# Patient Record
Sex: MALE | Race: WHITE | NOT HISPANIC OR LATINO | Employment: OTHER | URBAN - METROPOLITAN AREA
[De-identification: names, ages, dates, MRNs, and addresses within clinical notes are randomized per-mention and may not be internally consistent; named-entity substitution may affect disease eponyms.]

---

## 2021-06-03 ENCOUNTER — OFFICE VISIT (OUTPATIENT)
Dept: URGENT CARE | Facility: CLINIC | Age: 41
End: 2021-06-03
Payer: OTHER GOVERNMENT

## 2021-06-03 VITALS
HEART RATE: 72 BPM | RESPIRATION RATE: 18 BRPM | HEIGHT: 72 IN | DIASTOLIC BLOOD PRESSURE: 87 MMHG | TEMPERATURE: 98 F | BODY MASS INDEX: 24.11 KG/M2 | SYSTOLIC BLOOD PRESSURE: 147 MMHG | WEIGHT: 178 LBS | OXYGEN SATURATION: 96 %

## 2021-06-03 DIAGNOSIS — N34.2 URETHRITIS: ICD-10-CM

## 2021-06-03 DIAGNOSIS — R03.0 ELEVATED BP WITHOUT DIAGNOSIS OF HYPERTENSION: ICD-10-CM

## 2021-06-03 DIAGNOSIS — A64 STD (SEXUALLY TRANSMITTED DISEASE): ICD-10-CM

## 2021-06-03 DIAGNOSIS — N48.89 PAIN IN PENIS: Primary | ICD-10-CM

## 2021-06-03 LAB
BILIRUB UR QL STRIP: NEGATIVE
GLUCOSE UR QL STRIP: NEGATIVE
KETONES UR QL STRIP: NEGATIVE
LEUKOCYTE ESTERASE UR QL STRIP: NEGATIVE
PH, POC UA: 5 (ref 5–8)
POC BLOOD, URINE: NEGATIVE
POC NITRATES, URINE: NEGATIVE
PROT UR QL STRIP: NEGATIVE
SP GR UR STRIP: 1.02 (ref 1–1.03)
UROBILINOGEN UR STRIP-ACNC: NORMAL (ref 0.3–2.2)

## 2021-06-03 PROCEDURE — 80074 ACUTE HEPATITIS PANEL: CPT | Performed by: FAMILY MEDICINE

## 2021-06-03 PROCEDURE — 87491 CHLMYD TRACH DNA AMP PROBE: CPT | Performed by: FAMILY MEDICINE

## 2021-06-03 PROCEDURE — 96372 THER/PROPH/DIAG INJ SC/IM: CPT | Mod: S$GLB,,, | Performed by: FAMILY MEDICINE

## 2021-06-03 PROCEDURE — 81003 URINALYSIS AUTO W/O SCOPE: CPT | Mod: QW,S$GLB,, | Performed by: FAMILY MEDICINE

## 2021-06-03 PROCEDURE — 96372 PR INJECTION,THERAP/PROPH/DIAG2ST, IM OR SUBCUT: ICD-10-PCS | Mod: S$GLB,,, | Performed by: FAMILY MEDICINE

## 2021-06-03 PROCEDURE — 99214 OFFICE O/P EST MOD 30 MIN: CPT | Mod: 25,S$GLB,, | Performed by: FAMILY MEDICINE

## 2021-06-03 PROCEDURE — 81003 POCT URINALYSIS, DIPSTICK, AUTOMATED, W/O SCOPE: ICD-10-PCS | Mod: QW,S$GLB,, | Performed by: FAMILY MEDICINE

## 2021-06-03 PROCEDURE — 87591 N.GONORRHOEAE DNA AMP PROB: CPT | Performed by: FAMILY MEDICINE

## 2021-06-03 PROCEDURE — 86592 SYPHILIS TEST NON-TREP QUAL: CPT | Performed by: FAMILY MEDICINE

## 2021-06-03 PROCEDURE — 86703 HIV-1/HIV-2 1 RESULT ANTBDY: CPT | Performed by: FAMILY MEDICINE

## 2021-06-03 PROCEDURE — 99214 PR OFFICE/OUTPT VISIT, EST, LEVL IV, 30-39 MIN: ICD-10-PCS | Mod: 25,S$GLB,, | Performed by: FAMILY MEDICINE

## 2021-06-03 RX ORDER — AZITHROMYCIN 500 MG/1
1000 TABLET, FILM COATED ORAL ONCE
Qty: 2 TABLET | Refills: 0 | Status: SHIPPED | OUTPATIENT
Start: 2021-06-03 | End: 2021-06-03

## 2021-06-03 RX ORDER — CEFTRIAXONE 500 MG/1
500 INJECTION, POWDER, FOR SOLUTION INTRAMUSCULAR; INTRAVENOUS
Status: COMPLETED | OUTPATIENT
Start: 2021-06-03 | End: 2021-06-03

## 2021-06-03 RX ORDER — VALACYCLOVIR HYDROCHLORIDE 1 G/1
2000 TABLET, FILM COATED ORAL
COMMUNITY
Start: 2021-03-03 | End: 2022-10-28 | Stop reason: SDUPTHER

## 2021-06-03 RX ORDER — FINASTERIDE 1 MG/1
1 TABLET, FILM COATED ORAL
COMMUNITY
Start: 2021-04-29 | End: 2023-09-21 | Stop reason: SDUPTHER

## 2021-06-03 RX ADMIN — CEFTRIAXONE 500 MG: 500 INJECTION, POWDER, FOR SOLUTION INTRAMUSCULAR; INTRAVENOUS at 05:06

## 2021-06-04 LAB
HAV IGM SERPL QL IA: NEGATIVE
HBV CORE IGM SERPL QL IA: NEGATIVE
HBV SURFACE AG SERPL QL IA: NEGATIVE
HCV AB SERPL QL IA: NEGATIVE
RPR SER QL: NORMAL

## 2021-06-05 LAB
C TRACH DNA SPEC QL NAA+PROBE: NOT DETECTED
N GONORRHOEA DNA SPEC QL NAA+PROBE: NOT DETECTED

## 2021-06-08 ENCOUNTER — TELEPHONE (OUTPATIENT)
Dept: URGENT CARE | Facility: CLINIC | Age: 41
End: 2021-06-08

## 2021-06-08 LAB — HIV 1+2 AB+HIV1 P24 AG SERPL QL IA: NEGATIVE

## 2021-06-28 ENCOUNTER — SPECIALTY PHARMACY (OUTPATIENT)
Dept: PHARMACY | Facility: CLINIC | Age: 41
End: 2021-06-28

## 2021-06-28 ENCOUNTER — OFFICE VISIT (OUTPATIENT)
Dept: URGENT CARE | Facility: CLINIC | Age: 41
End: 2021-06-28
Payer: OTHER GOVERNMENT

## 2021-06-28 VITALS
HEART RATE: 73 BPM | SYSTOLIC BLOOD PRESSURE: 166 MMHG | RESPIRATION RATE: 16 BRPM | HEIGHT: 72 IN | DIASTOLIC BLOOD PRESSURE: 84 MMHG | BODY MASS INDEX: 23.7 KG/M2 | OXYGEN SATURATION: 98 % | TEMPERATURE: 98 F | WEIGHT: 175 LBS

## 2021-06-28 DIAGNOSIS — Z79.899 ON PRE-EXPOSURE PROPHYLAXIS FOR HIV: Primary | ICD-10-CM

## 2021-06-28 PROCEDURE — 99214 PR OFFICE/OUTPT VISIT, EST, LEVL IV, 30-39 MIN: ICD-10-PCS | Mod: S$GLB,,, | Performed by: FAMILY MEDICINE

## 2021-06-28 PROCEDURE — 99214 OFFICE O/P EST MOD 30 MIN: CPT | Mod: S$GLB,,, | Performed by: FAMILY MEDICINE

## 2021-06-28 RX ORDER — EMTRICITABINE AND TENOFOVIR DISOPROXIL FUMARATE 200; 300 MG/1; MG/1
1 TABLET, FILM COATED ORAL DAILY
Qty: 30 TABLET | Refills: 0 | Status: SHIPPED | OUTPATIENT
Start: 2021-06-28 | End: 2021-07-27 | Stop reason: SDUPTHER

## 2021-07-21 DIAGNOSIS — Z79.899 ON PRE-EXPOSURE PROPHYLAXIS FOR HIV: ICD-10-CM

## 2021-07-21 RX ORDER — EMTRICITABINE AND TENOFOVIR DISOPROXIL FUMARATE 200; 300 MG/1; MG/1
1 TABLET, FILM COATED ORAL DAILY
Qty: 30 TABLET | Refills: 0 | Status: CANCELLED | OUTPATIENT
Start: 2021-07-21 | End: 2022-07-21

## 2021-07-27 ENCOUNTER — SPECIALTY PHARMACY (OUTPATIENT)
Dept: PHARMACY | Facility: CLINIC | Age: 41
End: 2021-07-27

## 2021-07-27 ENCOUNTER — OFFICE VISIT (OUTPATIENT)
Dept: INTERNAL MEDICINE | Facility: CLINIC | Age: 41
End: 2021-07-27
Payer: OTHER GOVERNMENT

## 2021-07-27 DIAGNOSIS — Z79.899 ON PRE-EXPOSURE PROPHYLAXIS FOR HIV: ICD-10-CM

## 2021-07-27 PROCEDURE — 99213 OFFICE O/P EST LOW 20 MIN: CPT | Mod: 95,,, | Performed by: FAMILY MEDICINE

## 2021-07-27 PROCEDURE — 99213 PR OFFICE/OUTPT VISIT, EST, LEVL III, 20-29 MIN: ICD-10-PCS | Mod: 95,,, | Performed by: FAMILY MEDICINE

## 2021-07-27 RX ORDER — EMTRICITABINE AND TENOFOVIR DISOPROXIL FUMARATE 200; 300 MG/1; MG/1
1 TABLET, FILM COATED ORAL DAILY
Qty: 90 TABLET | Refills: 0 | Status: SHIPPED | OUTPATIENT
Start: 2021-07-27 | End: 2021-10-21 | Stop reason: SDUPTHER

## 2021-07-28 ENCOUNTER — PATIENT MESSAGE (OUTPATIENT)
Dept: PHARMACY | Facility: CLINIC | Age: 41
End: 2021-07-28

## 2021-08-20 ENCOUNTER — PATIENT MESSAGE (OUTPATIENT)
Dept: PHARMACY | Facility: CLINIC | Age: 41
End: 2021-08-20

## 2021-08-23 ENCOUNTER — SPECIALTY PHARMACY (OUTPATIENT)
Dept: PHARMACY | Facility: CLINIC | Age: 41
End: 2021-08-23

## 2021-09-30 ENCOUNTER — SPECIALTY PHARMACY (OUTPATIENT)
Dept: PHARMACY | Facility: CLINIC | Age: 41
End: 2021-09-30

## 2021-09-30 DIAGNOSIS — Z79.899 ON PRE-EXPOSURE PROPHYLAXIS FOR HIV: Primary | ICD-10-CM

## 2021-10-21 ENCOUNTER — SPECIALTY PHARMACY (OUTPATIENT)
Dept: PHARMACY | Facility: CLINIC | Age: 41
End: 2021-10-21
Payer: OTHER GOVERNMENT

## 2021-10-21 DIAGNOSIS — Z79.899 ON PRE-EXPOSURE PROPHYLAXIS FOR HIV: ICD-10-CM

## 2021-10-22 RX ORDER — EMTRICITABINE AND TENOFOVIR DISOPROXIL FUMARATE 200; 300 MG/1; MG/1
1 TABLET, FILM COATED ORAL DAILY
Qty: 90 TABLET | Refills: 0 | Status: SHIPPED | OUTPATIENT
Start: 2021-10-22 | End: 2022-01-19

## 2021-11-30 ENCOUNTER — PATIENT MESSAGE (OUTPATIENT)
Dept: PHARMACY | Facility: CLINIC | Age: 41
End: 2021-11-30
Payer: OTHER GOVERNMENT

## 2021-12-14 ENCOUNTER — SPECIALTY PHARMACY (OUTPATIENT)
Dept: PHARMACY | Facility: CLINIC | Age: 41
End: 2021-12-14
Payer: OTHER GOVERNMENT

## 2022-01-11 ENCOUNTER — PATIENT MESSAGE (OUTPATIENT)
Dept: PHARMACY | Facility: CLINIC | Age: 42
End: 2022-01-11
Payer: OTHER GOVERNMENT

## 2022-01-19 ENCOUNTER — SPECIALTY PHARMACY (OUTPATIENT)
Dept: PHARMACY | Facility: CLINIC | Age: 42
End: 2022-01-19
Payer: OTHER GOVERNMENT

## 2022-01-19 NOTE — TELEPHONE ENCOUNTER
[facility] contacted patient for Truvada as PrEP refill. The patient expressed that he no longer wants to continue PrEP since he is now in a stable relationship. He plans to have this conversation with his provider as well. Patient agreed to have [facility] close his profile since medication is no longer needed. He knows to contact [facility] if medication is ever needed in the future. No other questions or concerns at this time.

## 2022-10-28 ENCOUNTER — LAB VISIT (OUTPATIENT)
Dept: LAB | Facility: HOSPITAL | Age: 42
End: 2022-10-28
Attending: INTERNAL MEDICINE
Payer: OTHER GOVERNMENT

## 2022-10-28 ENCOUNTER — OFFICE VISIT (OUTPATIENT)
Dept: PRIMARY CARE CLINIC | Facility: CLINIC | Age: 42
End: 2022-10-28
Payer: OTHER GOVERNMENT

## 2022-10-28 VITALS
SYSTOLIC BLOOD PRESSURE: 114 MMHG | OXYGEN SATURATION: 99 % | HEIGHT: 72 IN | TEMPERATURE: 98 F | BODY MASS INDEX: 23.86 KG/M2 | RESPIRATION RATE: 18 BRPM | HEART RATE: 55 BPM | WEIGHT: 176.13 LBS | DIASTOLIC BLOOD PRESSURE: 76 MMHG

## 2022-10-28 DIAGNOSIS — L64.9 ANDROGENIC ALOPECIA: ICD-10-CM

## 2022-10-28 DIAGNOSIS — Z00.00 WELL ADULT EXAM: Primary | ICD-10-CM

## 2022-10-28 DIAGNOSIS — Z79.899 ON PRE-EXPOSURE PROPHYLAXIS FOR HIV: ICD-10-CM

## 2022-10-28 DIAGNOSIS — K52.9 CHRONIC DIARRHEA: ICD-10-CM

## 2022-10-28 DIAGNOSIS — B00.1 FEVER BLISTER: ICD-10-CM

## 2022-10-28 PROCEDURE — 80053 COMPREHEN METABOLIC PANEL: CPT | Performed by: INTERNAL MEDICINE

## 2022-10-28 PROCEDURE — 99396 PREV VISIT EST AGE 40-64: CPT | Mod: S$PBB,,, | Performed by: INTERNAL MEDICINE

## 2022-10-28 PROCEDURE — 99999 PR PBB SHADOW E&M-EST. PATIENT-LVL V: CPT | Mod: PBBFAC,,, | Performed by: INTERNAL MEDICINE

## 2022-10-28 PROCEDURE — 99999 PR PBB SHADOW E&M-EST. PATIENT-LVL V: ICD-10-PCS | Mod: PBBFAC,,, | Performed by: INTERNAL MEDICINE

## 2022-10-28 PROCEDURE — 87389 HIV-1 AG W/HIV-1&-2 AB AG IA: CPT | Performed by: INTERNAL MEDICINE

## 2022-10-28 PROCEDURE — 99396 PR PREVENTIVE VISIT,EST,40-64: ICD-10-PCS | Mod: S$PBB,,, | Performed by: INTERNAL MEDICINE

## 2022-10-28 PROCEDURE — 36415 COLL VENOUS BLD VENIPUNCTURE: CPT | Mod: PN | Performed by: INTERNAL MEDICINE

## 2022-10-28 PROCEDURE — 99215 OFFICE O/P EST HI 40 MIN: CPT | Mod: PBBFAC,PN | Performed by: INTERNAL MEDICINE

## 2022-10-28 RX ORDER — EMTRICITABINE AND TENOFOVIR DISOPROXIL FUMARATE 200; 300 MG/1; MG/1
TABLET, FILM COATED ORAL
COMMUNITY
Start: 2021-07-14 | End: 2022-10-28 | Stop reason: SDUPTHER

## 2022-10-28 RX ORDER — EMTRICITABINE AND TENOFOVIR DISOPROXIL FUMARATE 200; 300 MG/1; MG/1
1 TABLET, FILM COATED ORAL DAILY
Qty: 30 TABLET | Refills: 3 | Status: SHIPPED | OUTPATIENT
Start: 2022-10-28

## 2022-10-28 RX ORDER — VALACYCLOVIR HYDROCHLORIDE 1 G/1
2000 TABLET, FILM COATED ORAL
Qty: 30 TABLET | Refills: 0 | Status: SHIPPED | OUTPATIENT
Start: 2022-10-28 | End: 2023-01-30 | Stop reason: SDUPTHER

## 2022-10-28 RX ORDER — CIPROFLOXACIN 500 MG/1
500 TABLET ORAL EVERY 12 HOURS
Qty: 14 TABLET | Refills: 0 | Status: SHIPPED | OUTPATIENT
Start: 2022-10-28 | End: 2023-03-07

## 2022-10-28 NOTE — PROGRESS NOTES
[facility] Progress Note  10/28/2022          Reason for Visit:      had concerns including Establish Care, Annual Exam, and Medication Refill.       History of Present Illness:     Pt is here today to establish primary care.      Pt was previously on Truvada for PREP for HIV prophylaxis.  Was off for a while because he was in a monogamous relationship, but that relationship ended and so he would like to get back on the medication.  Reviewed labs from May 2022. Baseline creatinine has been stable around 1.36.  He works out 5 days per week.    On prior infection with gonorrhea, but no other history of STI.  Not currently having any symptoms.    Pt has chronic diarrhea and urgency to defectate.  No blood in stool  No unexpected weight loss or fevers.  No abdominal pains.  He was referred to GI by [facility] but never could make appt  He is interested in getting a new referral.    He is travelling to [place] and would like to get rx for cipro just in case.  He has history of fever blisters and would like to get a refill on valtrex.    On finasteride for androgenetic alopecia  No side effects            Problem List:     Patient Active Problem List   Diagnosis    On pre-exposure prophylaxis for HIV    Androgenic alopecia         Surgical History:     He has a past surgical history that includes none.      Family History:      His family history includes Colon cancer in his maternal grandmother; Colon cancer (age of onset: 58) in his maternal uncle; Hyperlipidemia in his father; Hypertension in his father; Thyroid disease in his mother.      Social History:     He reports that he has never smoked. He has never used smokeless tobacco. He reports current alcohol use of about 3.0 standard drinks per week. He reports that he does not use drugs.      Medications:       Current Outpatient Medications:     finasteride (PROPECIA) 1 mg tablet, Take 1 mg by mouth., Disp: , Rfl:     ciprofloxacin HCl (CIPRO)  500 MG tablet, Take 1 tablet (500 mg total) by mouth every 12 (twelve) hours., Disp: 14 tablet, Rfl: 0    emtricitabine-tenofovir 200-300 mg (TRUVADA) 200-300 mg Tab, Take 1 tablet by mouth once daily., Disp: 30 tablet, Rfl: 3    valACYclovir (VALTREX) 1000 MG tablet, Take 2 tablets (2,000 mg total) by mouth as needed., Disp: 30 tablet, Rfl: 0        Allergies:   He has No Known Allergies.      Review of Systems:     Review of Systems   Constitutional:  Negative for activity change and unexpected weight change.   HENT:  Negative for hearing loss, rhinorrhea and trouble swallowing.    Eyes:  Negative for discharge and visual disturbance.   Respiratory:  Negative for chest tightness and wheezing.    Cardiovascular:  Negative for chest pain and palpitations.   Gastrointestinal:  Positive for diarrhea. Negative for blood in stool, constipation and vomiting.   Endocrine: Negative for polydipsia and polyuria.   Genitourinary:  Negative for difficulty urinating, hematuria and urgency.   Musculoskeletal:  Negative for arthralgias, joint swelling and neck pain.   Neurological:  Negative for weakness and headaches.   Psychiatric/Behavioral:  Negative for confusion and dysphoric mood.          Physical Exam:     Temp:    97.9 °F (36.6 °C) (Oral)        Blood Pressure:  114/76        Pulse:   (!) 55     Respirations:  18  Weight:   79.9 kg (176 lb 2.4 oz)  Height:   6' (1.829 m)  BMI:     Body mass index is 23.89 kg/m².    Physical Exam  Constitutional:       General: He is not in acute distress.  HENT:      Right Ear: Tympanic membrane normal.      Left Ear: Tympanic membrane normal.      Nose: No congestion or rhinorrhea.      Mouth/Throat:      Pharynx: No oropharyngeal exudate or posterior oropharyngeal erythema.   Cardiovascular:      Rate and Rhythm: Normal rate and regular rhythm.   Pulmonary:      Effort: Pulmonary effort is normal. No respiratory distress.      Breath sounds: No wheezing.   Abdominal:      Palpations:  Abdomen is soft.      Tenderness: There is no abdominal tenderness.   Skin:     General: Skin is warm.   Neurological:      General: No focal deficit present.      Mental Status: He is alert and oriented to person, place, and time.         Labs/Imaging/Testing:     Reviewed labs from [facility] from may 2022:  Tchol 174  TG 67  Hdl 58    CBC ok  CMP - creatinine 1.36, otherwise normal  UA  normal      Assessment and Plan:     1. Well adult exam    2. On pre-exposure prophylaxis for HIV  Restart truvada  - Comprehensive Metabolic Panel; Future  - HIV 1/2 Ag/Ab (4th Gen); Future    3. Chronic diarrhea  - Ambulatory referral/consult to Gastroenterology; Future    4. Androgenic alopecia  Continue finasteride     RTC 3 mos or sooner mulugeta Sinclair MD  10/28/2022    This note was prepared using voice-recognition software.  Although efforts are made to proofread the note, some errors may persist in the final document.

## 2022-10-29 LAB
ALBUMIN SERPL BCP-MCNC: 4.2 G/DL (ref 3.5–5.2)
ALP SERPL-CCNC: 63 U/L (ref 55–135)
ALT SERPL W/O P-5'-P-CCNC: 15 U/L (ref 10–44)
ANION GAP SERPL CALC-SCNC: 10 MMOL/L (ref 8–16)
AST SERPL-CCNC: 24 U/L (ref 10–40)
BILIRUB SERPL-MCNC: 0.4 MG/DL (ref 0.1–1)
BUN SERPL-MCNC: 26 MG/DL (ref 6–20)
CALCIUM SERPL-MCNC: 9.2 MG/DL (ref 8.7–10.5)
CHLORIDE SERPL-SCNC: 104 MMOL/L (ref 95–110)
CO2 SERPL-SCNC: 24 MMOL/L (ref 23–29)
CREAT SERPL-MCNC: 1.1 MG/DL (ref 0.5–1.4)
EST. GFR  (NO RACE VARIABLE): >60 ML/MIN/1.73 M^2
GLUCOSE SERPL-MCNC: 61 MG/DL (ref 70–110)
HIV 1+2 AB+HIV1 P24 AG SERPL QL IA: NORMAL
POTASSIUM SERPL-SCNC: 4.9 MMOL/L (ref 3.5–5.1)
PROT SERPL-MCNC: 6.8 G/DL (ref 6–8.4)
SODIUM SERPL-SCNC: 138 MMOL/L (ref 136–145)

## 2022-10-31 ENCOUNTER — SPECIALTY PHARMACY (OUTPATIENT)
Dept: PHARMACY | Facility: CLINIC | Age: 42
End: 2022-10-31
Payer: OTHER GOVERNMENT

## 2022-10-31 DIAGNOSIS — Z79.899 ON PRE-EXPOSURE PROPHYLAXIS FOR HIV: Primary | ICD-10-CM

## 2022-10-31 NOTE — TELEPHONE ENCOUNTER
Truvada test claim - pharmacy not contracted with plan.     Benefits investigation required (Commercial - [facility]).

## 2022-11-03 NOTE — TELEPHONE ENCOUNTER
BI- [facility] - Rep- [facility]  Generic Truvada  [facility] not in network- preferred retail such as [employer] or [facility] mail  Deductible- $168 ($0 met)  [facility]- $1,120 ($167.06 met)  Estimated copay- $3 -30 days or 90 days/ $10.25

## 2022-11-04 NOTE — TELEPHONE ENCOUNTER
Attempted (attempt 1) to call patient and determine where he would like Truvada Rx. NA, LVM.     There is local [employer] on file ([facility] #[id number] - [place], [place] - [facility] [[id number]]).

## 2022-11-08 NOTE — TELEPHONE ENCOUNTER
Contacted patient regarding Truvada Rx. He opted to fill Rx with [facility] #[id number] - [place], [place] - [address] [place] [address] [address]. Rx routed per patient request. Receipt confirmed by pharmacy (11/8/2022 10:01 AM CST). Closing [facility]'s enrollment.

## 2023-02-02 ENCOUNTER — TELEPHONE (OUTPATIENT)
Dept: PRIMARY CARE CLINIC | Facility: CLINIC | Age: 43
End: 2023-02-02
Payer: OTHER GOVERNMENT

## 2023-02-02 RX ORDER — VALACYCLOVIR HYDROCHLORIDE 1 G/1
2000 TABLET, FILM COATED ORAL 2 TIMES DAILY PRN
Qty: 30 TABLET | Refills: 2 | Status: SHIPPED | OUTPATIENT
Start: 2023-02-02 | End: 2023-03-07 | Stop reason: SDUPTHER

## 2023-02-06 ENCOUNTER — PATIENT MESSAGE (OUTPATIENT)
Dept: PRIMARY CARE CLINIC | Facility: CLINIC | Age: 43
End: 2023-02-06
Payer: OTHER GOVERNMENT

## 2023-02-25 ENCOUNTER — OFFICE VISIT (OUTPATIENT)
Dept: URGENT CARE | Facility: CLINIC | Age: 43
End: 2023-02-25
Payer: OTHER GOVERNMENT

## 2023-02-25 VITALS
DIASTOLIC BLOOD PRESSURE: 84 MMHG | RESPIRATION RATE: 19 BRPM | HEIGHT: 72 IN | TEMPERATURE: 98 F | SYSTOLIC BLOOD PRESSURE: 131 MMHG | BODY MASS INDEX: 23.86 KG/M2 | HEART RATE: 68 BPM | WEIGHT: 176.13 LBS | OXYGEN SATURATION: 99 %

## 2023-02-25 DIAGNOSIS — J02.9 SORE THROAT: ICD-10-CM

## 2023-02-25 DIAGNOSIS — B37.9 CANDIDIASIS: Primary | ICD-10-CM

## 2023-02-25 PROCEDURE — 99213 OFFICE O/P EST LOW 20 MIN: CPT | Mod: S$GLB,,, | Performed by: FAMILY MEDICINE

## 2023-02-25 PROCEDURE — 99213 PR OFFICE/OUTPT VISIT, EST, LEVL III, 20-29 MIN: ICD-10-PCS | Mod: S$GLB,,, | Performed by: FAMILY MEDICINE

## 2023-02-25 RX ORDER — FLUCONAZOLE 150 MG/1
150 TABLET ORAL DAILY
Qty: 10 TABLET | Refills: 0 | Status: SHIPPED | OUTPATIENT
Start: 2023-02-25 | End: 2023-03-07

## 2023-02-25 RX ORDER — CLOTRIMAZOLE AND BETAMETHASONE DIPROPIONATE 10; .64 MG/G; MG/G
CREAM TOPICAL 2 TIMES DAILY
COMMUNITY
Start: 2023-02-06

## 2023-02-25 RX ORDER — FLUCONAZOLE 150 MG/1
150 TABLET ORAL DAILY
Qty: 10 TABLET | Refills: 0 | Status: SHIPPED | OUTPATIENT
Start: 2023-02-25 | End: 2023-02-25

## 2023-02-25 RX ORDER — CLOTRIMAZOLE 1 %
CREAM (GRAM) TOPICAL 2 TIMES DAILY
COMMUNITY
Start: 2023-01-30

## 2023-02-25 NOTE — PROGRESS NOTES
Subjective:       Patient ID: Jono Cuello is a 42 y.o. male.      Chief Complaint: Sore Throat    Patient reports to the clinic with the compliant of a sore throat that presented 3-4 weeks ago; he reports with having thrush that was dx back in January; however with oral solution Nystatin the relief was minimal and now, thrush has returned. Patient requesting oral fluconazole tablets.     Sore Throat   This is a new problem. The current episode started 1 to 4 weeks ago. The problem has been unchanged. Neither side of throat is experiencing more pain than the other. There has been no fever. The pain is at a severity of 2/10. The pain is mild. Associated symptoms include swollen glands. Pertinent negatives include no congestion, coughing, diarrhea, drooling, ear discharge, ear pain, headaches, hoarse voice, plugged ear sensation, neck pain, shortness of breath, stridor, trouble swallowing or vomiting. He has had no exposure to strep or mono. Treatments tried: Nystatin and NSAIDs. The treatment provided no relief.     HENT:  Positive for sore throat. Negative for ear pain, ear discharge, drooling, congestion and trouble swallowing.    Neck: Negative for neck pain.   Respiratory:  Negative for cough, shortness of breath and stridor.    Gastrointestinal:  Negative for vomiting and diarrhea.   Neurological:  Negative for headaches.     Objective:      Vitals:    02/25/23 1347   BP: 131/84   Pulse: 68   Resp: 19   Temp: 97.7 °F (36.5 °C)   TempSrc: Oral   SpO2: 99%   Weight: 79.9 kg (176 lb 2.4 oz)   Height: 6' (1.829 m)      Physical Exam   Constitutional: He is oriented to person, place, and time.  Non-toxic appearance. He does not appear ill. No distress.   HENT:   Head: Atraumatic.   Mouth/Throat: No oropharyngeal exudate or posterior oropharyngeal erythema.      Comments: thrush  Eyes: Conjunctivae are normal.   Neck: Neck supple.   Cardiovascular: Normal rate, regular rhythm, normal heart sounds and normal pulses.    Pulmonary/Chest: Effort normal and breath sounds normal.   Lymphadenopathy:     He has no cervical adenopathy.   Neurological: He is alert and oriented to person, place, and time.   Skin: Skin is not diaphoretic.   Psychiatric: Judgment and thought content normal.       Assessment:       1. Candidiasis    2. Sore throat          Plan:         Candidiasis  -     fluconazole (DIFLUCAN) 150 MG Tab; Take 1 tablet (150 mg total) by mouth once daily. May repeat after 72 hrs for max efficacy for 10 doses  Dispense: 10 tablet; Refill: 0    2. Sore throat  He has been tested for Gonorrhea/ Chlamydia and he reports negative results.

## 2023-03-06 ENCOUNTER — CLINICAL SUPPORT (OUTPATIENT)
Dept: INFECTIOUS DISEASES | Facility: CLINIC | Age: 43
End: 2023-03-06
Payer: OTHER GOVERNMENT

## 2023-03-06 ENCOUNTER — OFFICE VISIT (OUTPATIENT)
Dept: URGENT CARE | Facility: CLINIC | Age: 43
End: 2023-03-06
Payer: OTHER GOVERNMENT

## 2023-03-06 VITALS
BODY MASS INDEX: 23.84 KG/M2 | HEART RATE: 70 BPM | RESPIRATION RATE: 18 BRPM | HEIGHT: 72 IN | OXYGEN SATURATION: 97 % | TEMPERATURE: 98 F | DIASTOLIC BLOOD PRESSURE: 91 MMHG | WEIGHT: 176 LBS | SYSTOLIC BLOOD PRESSURE: 145 MMHG

## 2023-03-06 DIAGNOSIS — A64 STD (MALE): Primary | ICD-10-CM

## 2023-03-06 DIAGNOSIS — A53.9 SYPHILIS: ICD-10-CM

## 2023-03-06 LAB
HAV IGM SERPL QL IA: NORMAL
HBV CORE IGM SERPL QL IA: NORMAL
HBV SURFACE AG SERPL QL IA: NORMAL
HCV AB SERPL QL IA: NORMAL
HIV 1+2 AB+HIV1 P24 AG SERPL QL IA: NORMAL

## 2023-03-06 PROCEDURE — 99214 OFFICE O/P EST MOD 30 MIN: CPT | Mod: 25,S$GLB,, | Performed by: FAMILY MEDICINE

## 2023-03-06 PROCEDURE — 87661 TRICHOMONAS VAGINALIS AMPLIF: CPT | Performed by: FAMILY MEDICINE

## 2023-03-06 PROCEDURE — 99214 PR OFFICE/OUTPT VISIT, EST, LEVL IV, 30-39 MIN: ICD-10-PCS | Mod: 25,S$GLB,, | Performed by: FAMILY MEDICINE

## 2023-03-06 PROCEDURE — 87389 HIV-1 AG W/HIV-1&-2 AB AG IA: CPT | Performed by: FAMILY MEDICINE

## 2023-03-06 PROCEDURE — 80074 ACUTE HEPATITIS PANEL: CPT | Performed by: FAMILY MEDICINE

## 2023-03-06 PROCEDURE — 99211 OFF/OP EST MAY X REQ PHY/QHP: CPT | Mod: PBBFAC

## 2023-03-06 PROCEDURE — 36415 COLL VENOUS BLD VENIPUNCTURE: CPT | Performed by: FAMILY MEDICINE

## 2023-03-06 PROCEDURE — 86592 SYPHILIS TEST NON-TREP QUAL: CPT | Performed by: FAMILY MEDICINE

## 2023-03-06 PROCEDURE — 96372 THER/PROPH/DIAG INJ SC/IM: CPT | Mod: S$GLB,,, | Performed by: FAMILY MEDICINE

## 2023-03-06 PROCEDURE — 99999 PR PBB SHADOW E&M-EST. PATIENT-LVL I: ICD-10-PCS | Mod: PBBFAC,,,

## 2023-03-06 PROCEDURE — 87591 N.GONORRHOEAE DNA AMP PROB: CPT | Performed by: FAMILY MEDICINE

## 2023-03-06 PROCEDURE — 96372 PR INJECTION,THERAP/PROPH/DIAG2ST, IM OR SUBCUT: ICD-10-PCS | Mod: S$GLB,,, | Performed by: FAMILY MEDICINE

## 2023-03-06 PROCEDURE — 99999 PR PBB SHADOW E&M-EST. PATIENT-LVL I: CPT | Mod: PBBFAC,,,

## 2023-03-06 RX ORDER — CEFTRIAXONE 250 MG/1
250 INJECTION, POWDER, FOR SOLUTION INTRAMUSCULAR; INTRAVENOUS
Status: COMPLETED | OUTPATIENT
Start: 2023-03-06 | End: 2023-03-06

## 2023-03-06 RX ORDER — DOXYCYCLINE HYCLATE 100 MG
100 TABLET ORAL 2 TIMES DAILY
Qty: 14 TABLET | Refills: 0 | Status: SHIPPED | OUTPATIENT
Start: 2023-03-06

## 2023-03-06 RX ADMIN — CEFTRIAXONE 250 MG: 250 INJECTION, POWDER, FOR SOLUTION INTRAMUSCULAR; INTRAVENOUS at 10:03

## 2023-03-06 NOTE — PROGRESS NOTES
[facility] Progress Note  3/7/2023          Reason for Visit:      had concerns including Hypertension.       History of Present Illness:     On Prep  Had recent white, mildly painful tongue.  Was treated as thrush, but didn't have improvement.  Also was having mild redness at tip of penis and dysuria.  Saw urgent care yesterday after he was informed of known syphillis exposure.  Was treated with penicillin G and cetriaxone.  The dysuria has improved, but tongue not looking any different.  Agreeable to do oral GC today  Roommate also has strep and would be interested in getting tested for that,   Urine G negative yesterday, trichomonas and RPR pending  RPR 6/2021 was negative  Pt had remote syphillis infection with similar urethral symptoms that was treated.  Did not take the doxycyline rx yesterday so far.      Chronic diarrhea  Resolved after making diet changes    Androgenic alopecia  Stable    Wants routine lab screening for cholesterol, electrolytes, etc.  Has been having occasional elevated bp readings in the 130s/80s.  He is agreeable to get a home bp cuff and monitor.      Problem List:     Patient Active Problem List   Diagnosis    On pre-exposure prophylaxis for HIV    Androgenic alopecia    History of Fever blisters    STD (male)         Medications:       Current Outpatient Medications:     clotrimazole (LOTRIMIN) 1 % cream, Apply topically 2 (two) times daily., Disp: , Rfl:     clotrimazole-betamethasone 1-0.05% (LOTRISONE) cream, Apply topically 2 (two) times daily., Disp: , Rfl:     doxycycline (VIBRA-TABS) 100 MG tablet, Take 1 tablet (100 mg total) by mouth 2 (two) times daily., Disp: 14 tablet, Rfl: 0    emtricitabine-tenofovir 200-300 mg (TRUVADA) 200-300 mg Tab, Take 1 tablet by mouth once daily., Disp: 30 tablet, Rfl: 3    finasteride (PROPECIA) 1 mg tablet, Take 1 mg by mouth., Disp: , Rfl:     fluconazole (DIFLUCAN) 150 MG Tab, Take 1 tablet (150 mg total) by mouth once  daily. for 10 days, Disp: 10 tablet, Rfl: 0    ciprofloxacin HCl (CIPRO) 500 MG tablet, Take 1 tablet (500 mg total) by mouth every 12 (twelve) hours., Disp: 14 tablet, Rfl: 0    valACYclovir (VALTREX) 1000 MG tablet, Take 2 tablets (2,000 mg total) by mouth 2 (two) times daily as needed (as needed for fever blister)., Disp: 30 tablet, Rfl: 2    Current Facility-Administered Medications:     penicillin G benzathine (BICILLIN [place]) injection 2.4 Million Units, 2.4 Million Units, Intramuscular, 1 time in Clinic/HOD, Alyse Crabtree MD        Review of Systems:     Review of Systems   Constitutional:  Negative for chills and fever.   HENT:  Negative for ear pain and sore throat.    Respiratory:  Negative for cough, shortness of breath and wheezing.    Cardiovascular:  Negative for chest pain and palpitations.   Gastrointestinal:  Negative for constipation, diarrhea, nausea and vomiting.   Genitourinary:  Negative for dysuria and hematuria.   Musculoskeletal:  Negative for joint swelling and joint deformity.   Neurological:  Negative for dizziness and weakness.         Physical Exam:     Temp:             Blood Pressure:  124/82        Pulse:   61     Respirations:  18  Weight:   77.6 kg (171 lb 1.2 oz)  Height:   6' (1.829 m)  BMI:     Body mass index is 23.2 kg/m².    Physical Exam  Constitutional:       General: He is not in acute distress.  HENT:      Right Ear: Tympanic membrane normal.      Left Ear: Tympanic membrane normal.      Nose: No congestion or rhinorrhea.      Mouth/Throat:      Pharynx: No oropharyngeal exudate or posterior oropharyngeal erythema.      Comments: Mild while discoloration of throat.  No exudate on posterior pharyngx.    Cardiovascular:      Rate and Rhythm: Normal rate and regular rhythm.   Pulmonary:      Effort: Pulmonary effort is normal. No respiratory distress.      Breath sounds: No wheezing.   Abdominal:      Palpations: Abdomen is soft.      Tenderness: There is no  abdominal tenderness.   Lymphadenopathy:      Cervical: No cervical adenopathy.   Skin:     General: Skin is warm.   Neurological:      General: No focal deficit present.      Mental Status: He is alert and oriented to person, place, and time.         Labs/Imaging/Testing:       Most recent Chemistry:  Lab Results   Component Value Date     10/28/2022    K 4.9 10/28/2022     10/28/2022    CO2 24 10/28/2022    BUN 26 (H) 10/28/2022    CREATININE 1.1 10/28/2022    GLU 61 (L) 10/28/2022    CALCIUM 9.2 10/28/2022    ALT 15 10/28/2022    AST 24 10/28/2022    ALKPHOS 63 10/28/2022    PROT 6.8 10/28/2022    ALBUMIN 4.2 10/28/2022     HIV negative yesterday  Urine GC negative  RPR, Trichomonas pending      Assessment and Plan:     1. Well adult exam  - CBC Auto Differential; Future  - Comprehensive Metabolic Panel; Future  - Lipid Panel; Future  - Hemoglobin A1C; Future  - TSH; Future  - C.trach/N.gonor AMP RNA  - POCT Strep A, Molecular    2. On pre-exposure prophylaxis for HIV  - CBC Auto Differential; Future  - Comprehensive Metabolic Panel; Future  - Lipid Panel; Future  - Hemoglobin A1C; Future  - TSH; Future  - C.trach/N.gonor AMP RNA  - POCT Strep A, Molecular    3. Sore throat  - CBC Auto Differential; Future  - Comprehensive Metabolic Panel; Future  - Lipid Panel; Future  - Hemoglobin A1C; Future  - TSH; Future  - C.trach/N.gonor AMP RNA  - POCT Strep A, Molecular       Discussed monitoring bp readings at home and keeping a log to review.         Yuriy Sinclair MD  3/7/2023    This note was prepared using voice-recognition software.  Although efforts are made to proofread the note, some errors may persist in the final document.

## 2023-03-06 NOTE — PROGRESS NOTES
Pt given 2.4 million units Bicillin-LA  IM in left glut (per MD written rx order), pt tolerated well and left clinic NAD after observation time.

## 2023-03-06 NOTE — PROGRESS NOTES
Subjective:       Patient ID: Jono Cuello is a 42 y.o. male.    Vitals:  height is 6' (1.829 m) and weight is 79.8 kg (176 lb). His temperature is 98 °F (36.7 °C). His blood pressure is 145/91 (abnormal) and his pulse is 70. His respiration is 18 and oxygen saturation is 97%.     Chief Complaint: Rash    Pt complains x1 month of pain in throat rash starting to form on groin area with dysuria. Pt came in contact with syphilis.      Rash  This is a new problem. The current episode started 1 to 4 weeks ago. The problem has been gradually worsening since onset. The affected locations include the groin and genitalia (throat). The rash is characterized by blistering, redness, swelling, burning and pain. Associated with: syphilis. Associated symptoms include a sore throat. Pertinent negatives include no anorexia, congestion, cough, diarrhea, eye pain, facial edema, fatigue, fever, joint pain, nail changes, rhinorrhea, shortness of breath or vomiting.     Constitution: Negative for fatigue and fever.   HENT:  Positive for sore throat. Negative for congestion.    Eyes:  Negative for eye pain.   Respiratory:  Negative for cough and shortness of breath.    Gastrointestinal:  Negative for vomiting and diarrhea.   Skin:  Positive for rash.     Objective:      Physical Exam   Constitutional: He appears distressed. normal  HENT:   Head: Normocephalic and atraumatic.   Ears:   Right Ear: Tympanic membrane, external ear and ear canal normal.   Left Ear: Tympanic membrane, external ear and ear canal normal.   Nose: No rhinorrhea or congestion.   Mouth/Throat: Mucous membranes are moist. Posterior oropharyngeal erythema present. No oropharyngeal exudate. Oropharynx is clear.   Neck: Neck supple.   Cardiovascular: Normal rate, regular rhythm, normal heart sounds and normal pulses.   No murmur heard.  Pulmonary/Chest: Effort normal and breath sounds normal. No stridor. No respiratory distress.   Abdominal: Normal appearance and bowel  sounds are normal. Soft. flat abdomen   Genitourinary:               Comments: Healing chanchroid ulcer     Neurological: no focal deficit. He is alert and at baseline. He is disoriented. No cranial nerve deficit.   Skin: Skin is warm, dry and rash. Capillary refill takes less than 2 seconds.              Comments: Follicullar rash on both inner thighs   Psychiatric: His behavior is normal. Mood, judgment and thought content normal.   Nursing note and vitals reviewed.      Assessment:Plan:     1. STD (male)  - Trichomonas vaginalis, RNA, Qual, Urine  - C. trachomatis/N. gonorrhoeae by AMP DNA [facility]; Urine  - RPR  - HIV 1/2 Ag/Ab (4th Gen)  - HEPATITIS PANEL, ACUTE  - cefTRIAXone injection 250 mg  - doxycycline (VIBRA-TABS) 100 MG tablet; Take 1 tablet (100 mg total) by mouth 2 (two) times daily.  Dispense: 14 tablet; Refill: 0  - penicillin G benzathine (BICILLIN [place]) 2,400,000 unit/4 mL Syrg; Inject 4 mLs (2.4 Million Units total) into the muscle once. for 1 dose  Dispense: 4 mL; Refill: 0  - Ambulatory referral/consult to Infectious Disease

## 2023-03-07 ENCOUNTER — OFFICE VISIT (OUTPATIENT)
Dept: PRIMARY CARE CLINIC | Facility: CLINIC | Age: 43
End: 2023-03-07
Payer: OTHER GOVERNMENT

## 2023-03-07 ENCOUNTER — TELEPHONE (OUTPATIENT)
Dept: URGENT CARE | Facility: CLINIC | Age: 43
End: 2023-03-07
Payer: OTHER GOVERNMENT

## 2023-03-07 ENCOUNTER — LAB VISIT (OUTPATIENT)
Dept: LAB | Facility: HOSPITAL | Age: 43
End: 2023-03-07
Attending: INTERNAL MEDICINE
Payer: OTHER GOVERNMENT

## 2023-03-07 VITALS
RESPIRATION RATE: 18 BRPM | BODY MASS INDEX: 23.17 KG/M2 | OXYGEN SATURATION: 100 % | SYSTOLIC BLOOD PRESSURE: 124 MMHG | WEIGHT: 171.06 LBS | DIASTOLIC BLOOD PRESSURE: 82 MMHG | HEART RATE: 61 BPM | HEIGHT: 72 IN

## 2023-03-07 DIAGNOSIS — Z79.899 ON PRE-EXPOSURE PROPHYLAXIS FOR HIV: ICD-10-CM

## 2023-03-07 DIAGNOSIS — J02.9 SORE THROAT: ICD-10-CM

## 2023-03-07 DIAGNOSIS — Z00.00 WELL ADULT EXAM: Primary | ICD-10-CM

## 2023-03-07 DIAGNOSIS — Z00.00 WELL ADULT EXAM: ICD-10-CM

## 2023-03-07 LAB
ALBUMIN SERPL BCP-MCNC: 4.4 G/DL (ref 3.5–5.2)
ALP SERPL-CCNC: 61 U/L (ref 55–135)
ALT SERPL W/O P-5'-P-CCNC: 17 U/L (ref 10–44)
ANION GAP SERPL CALC-SCNC: 6 MMOL/L (ref 8–16)
AST SERPL-CCNC: 33 U/L (ref 10–40)
BASOPHILS # BLD AUTO: 0.04 K/UL (ref 0–0.2)
BASOPHILS NFR BLD: 0.6 % (ref 0–1.9)
BILIRUB SERPL-MCNC: 0.8 MG/DL (ref 0.1–1)
BUN SERPL-MCNC: 18 MG/DL (ref 6–20)
C TRACH DNA SPEC QL NAA+PROBE: NOT DETECTED
CALCIUM SERPL-MCNC: 9.3 MG/DL (ref 8.7–10.5)
CHLORIDE SERPL-SCNC: 104 MMOL/L (ref 95–110)
CHOLEST SERPL-MCNC: 171 MG/DL (ref 120–199)
CHOLEST/HDLC SERPL: 3.4 {RATIO} (ref 2–5)
CO2 SERPL-SCNC: 27 MMOL/L (ref 23–29)
CREAT SERPL-MCNC: 1.2 MG/DL (ref 0.5–1.4)
DIFFERENTIAL METHOD: ABNORMAL
EOSINOPHIL # BLD AUTO: 0 K/UL (ref 0–0.5)
EOSINOPHIL NFR BLD: 0.1 % (ref 0–8)
ERYTHROCYTE [DISTWIDTH] IN BLOOD BY AUTOMATED COUNT: 12.2 % (ref 11.5–14.5)
EST. GFR  (NO RACE VARIABLE): >60 ML/MIN/1.73 M^2
ESTIMATED AVG GLUCOSE: 100 MG/DL (ref 68–131)
GLUCOSE SERPL-MCNC: 101 MG/DL (ref 70–110)
HBA1C MFR BLD: 5.1 % (ref 4–5.6)
HCT VFR BLD AUTO: 45.7 % (ref 40–54)
HDLC SERPL-MCNC: 51 MG/DL (ref 40–75)
HDLC SERPL: 29.8 % (ref 20–50)
HGB BLD-MCNC: 15.1 G/DL (ref 14–18)
IMM GRANULOCYTES # BLD AUTO: 0.02 K/UL (ref 0–0.04)
IMM GRANULOCYTES NFR BLD AUTO: 0.3 % (ref 0–0.5)
LDLC SERPL CALC-MCNC: 109.8 MG/DL (ref 63–159)
LYMPHOCYTES # BLD AUTO: 1.6 K/UL (ref 1–4.8)
LYMPHOCYTES NFR BLD: 23.5 % (ref 18–48)
MCH RBC QN AUTO: 31.7 PG (ref 27–31)
MCHC RBC AUTO-ENTMCNC: 33 G/DL (ref 32–36)
MCV RBC AUTO: 96 FL (ref 82–98)
MONOCYTES # BLD AUTO: 0.4 K/UL (ref 0.3–1)
MONOCYTES NFR BLD: 6.4 % (ref 4–15)
N GONORRHOEA DNA SPEC QL NAA+PROBE: NOT DETECTED
NEUTROPHILS # BLD AUTO: 4.7 K/UL (ref 1.8–7.7)
NEUTROPHILS NFR BLD: 69.1 % (ref 38–73)
NONHDLC SERPL-MCNC: 120 MG/DL
NRBC BLD-RTO: 0 /100 WBC
PLATELET # BLD AUTO: 262 K/UL (ref 150–450)
PMV BLD AUTO: 11.4 FL (ref 9.2–12.9)
POTASSIUM SERPL-SCNC: 5 MMOL/L (ref 3.5–5.1)
PROT SERPL-MCNC: 7.2 G/DL (ref 6–8.4)
RBC # BLD AUTO: 4.77 M/UL (ref 4.6–6.2)
RPR SER QL: NORMAL
SODIUM SERPL-SCNC: 137 MMOL/L (ref 136–145)
TRIGL SERPL-MCNC: 51 MG/DL (ref 30–150)
TSH SERPL DL<=0.005 MIU/L-ACNC: 0.94 UIU/ML (ref 0.4–4)
WBC # BLD AUTO: 6.86 K/UL (ref 3.9–12.7)

## 2023-03-07 PROCEDURE — 36415 COLL VENOUS BLD VENIPUNCTURE: CPT | Mod: PN | Performed by: INTERNAL MEDICINE

## 2023-03-07 PROCEDURE — 80053 COMPREHEN METABOLIC PANEL: CPT | Performed by: INTERNAL MEDICINE

## 2023-03-07 PROCEDURE — 99214 OFFICE O/P EST MOD 30 MIN: CPT | Mod: PBBFAC,PN | Performed by: INTERNAL MEDICINE

## 2023-03-07 PROCEDURE — 80061 LIPID PANEL: CPT | Performed by: INTERNAL MEDICINE

## 2023-03-07 PROCEDURE — 99396 PR PREVENTIVE VISIT,EST,40-64: ICD-10-PCS | Mod: S$PBB,,, | Performed by: INTERNAL MEDICINE

## 2023-03-07 PROCEDURE — 99999 PR PBB SHADOW E&M-EST. PATIENT-LVL IV: ICD-10-PCS | Mod: PBBFAC,,, | Performed by: INTERNAL MEDICINE

## 2023-03-07 PROCEDURE — 83036 HEMOGLOBIN GLYCOSYLATED A1C: CPT | Performed by: INTERNAL MEDICINE

## 2023-03-07 PROCEDURE — 84443 ASSAY THYROID STIM HORMONE: CPT | Performed by: INTERNAL MEDICINE

## 2023-03-07 PROCEDURE — 99396 PREV VISIT EST AGE 40-64: CPT | Mod: S$PBB,,, | Performed by: INTERNAL MEDICINE

## 2023-03-07 PROCEDURE — 87491 CHLMYD TRACH DNA AMP PROBE: CPT | Performed by: INTERNAL MEDICINE

## 2023-03-07 PROCEDURE — 99999 PR PBB SHADOW E&M-EST. PATIENT-LVL IV: CPT | Mod: PBBFAC,,, | Performed by: INTERNAL MEDICINE

## 2023-03-07 PROCEDURE — 85025 COMPLETE CBC W/AUTO DIFF WBC: CPT | Performed by: INTERNAL MEDICINE

## 2023-03-07 RX ORDER — VALACYCLOVIR HYDROCHLORIDE 1 G/1
2000 TABLET, FILM COATED ORAL 2 TIMES DAILY PRN
Qty: 30 TABLET | Refills: 2 | Status: SHIPPED | OUTPATIENT
Start: 2023-03-07 | End: 2023-03-08

## 2023-03-07 NOTE — TELEPHONE ENCOUNTER
Called and discussed negative RPR, HIV, Hepatitis Panel and GC chlamydia results. Patient verbalized understanding.

## 2023-03-08 ENCOUNTER — PATIENT MESSAGE (OUTPATIENT)
Dept: PRIMARY CARE CLINIC | Facility: CLINIC | Age: 43
End: 2023-03-08
Payer: OTHER GOVERNMENT

## 2023-03-09 ENCOUNTER — TELEPHONE (OUTPATIENT)
Dept: URGENT CARE | Facility: CLINIC | Age: 43
End: 2023-03-09
Payer: OTHER GOVERNMENT

## 2023-03-09 LAB
C TRACH RRNA SPEC QL NAA+PROBE: NEGATIVE
N GONORRHOEA RRNA SPEC QL NAA+PROBE: NEGATIVE
N.GONORROHEAE, AMP RNA SOURCE: NORMAL
SPECIMEN SOURCE: NORMAL
T VAGINALIS RRNA SPEC QL NAA+PROBE: NOT DETECTED
TRICHOMONAS VAGINALIS RNA, QUAL, SOURCE: NORMAL

## 2023-03-10 ENCOUNTER — OFFICE VISIT (OUTPATIENT)
Dept: PRIMARY CARE CLINIC | Facility: CLINIC | Age: 43
End: 2023-03-10
Payer: OTHER GOVERNMENT

## 2023-03-10 VITALS
OXYGEN SATURATION: 99 % | HEART RATE: 67 BPM | WEIGHT: 177.25 LBS | SYSTOLIC BLOOD PRESSURE: 126 MMHG | DIASTOLIC BLOOD PRESSURE: 76 MMHG | RESPIRATION RATE: 18 BRPM | BODY MASS INDEX: 24.01 KG/M2 | HEIGHT: 72 IN

## 2023-03-10 DIAGNOSIS — J02.9 SORE THROAT: Primary | ICD-10-CM

## 2023-03-10 LAB
CTP QC/QA: YES
MOLECULAR STREP A: NEGATIVE

## 2023-03-10 PROCEDURE — 87651 STREP A DNA AMP PROBE: CPT | Mod: PBBFAC,PN | Performed by: INTERNAL MEDICINE

## 2023-03-10 PROCEDURE — 96372 THER/PROPH/DIAG INJ SC/IM: CPT | Mod: PBBFAC,PN

## 2023-03-10 PROCEDURE — 99214 PR OFFICE/OUTPT VISIT, EST, LEVL IV, 30-39 MIN: ICD-10-PCS | Mod: S$PBB,,, | Performed by: INTERNAL MEDICINE

## 2023-03-10 PROCEDURE — 99214 OFFICE O/P EST MOD 30 MIN: CPT | Mod: S$PBB,,, | Performed by: INTERNAL MEDICINE

## 2023-03-10 PROCEDURE — 99999 PR PBB SHADOW E&M-EST. PATIENT-LVL III: CPT | Mod: PBBFAC,,, | Performed by: INTERNAL MEDICINE

## 2023-03-10 PROCEDURE — 99213 OFFICE O/P EST LOW 20 MIN: CPT | Mod: PBBFAC,PN | Performed by: INTERNAL MEDICINE

## 2023-03-10 PROCEDURE — 99999 PR PBB SHADOW E&M-EST. PATIENT-LVL III: ICD-10-PCS | Mod: PBBFAC,,, | Performed by: INTERNAL MEDICINE

## 2023-03-10 RX ORDER — CEFTRIAXONE 1 G/1
1 INJECTION, POWDER, FOR SOLUTION INTRAMUSCULAR; INTRAVENOUS
Status: COMPLETED | OUTPATIENT
Start: 2023-03-10 | End: 2023-03-10

## 2023-03-10 RX ADMIN — CEFTRIAXONE SODIUM 1 G: 1 INJECTION, POWDER, FOR SOLUTION INTRAMUSCULAR; INTRAVENOUS at 03:03

## 2023-03-10 NOTE — PROGRESS NOTES
[facility] Progress Note  3/10/2023          Reason for Visit:      had concerns including Follow-up.       History of Present Illness:     Pt was seen earlier this week  Had recent white, mildly painful tongue.  Was treated as thrush, but didn't have improvement.  Also was having mild redness at tip of penis and dysuria.  Saw urgent care yesterday after he was informed of known syphillis exposure.  Was treated with penicillin G and cetriaxone.  The tongue whiteness has almost resolved now after dealing with it for nearly 2 months.    The dysuria has also improved, though not entirely gone.  There are small flesh colored tiny bumps clustered on the shaft of the penis just proximal to the glans.  There are mildly painful.  Not vesicular.    Testing so far has included:  - Negative oral GC  - Negative strep swab  - Negative urine GC  - Negative urine mycoplasma  - Negative RPR    Treatment so far has included  - initially treated with diflucan and nystatin for possible thrush- no improvement  - received penicillin G and ceftriaxone shots earlier this week  - received doxycycline which he has now started.    Most everything seems to be improving, but now has painful sore throat.  No exudative tonsillitis.  Roommate had strep, but pt had negative strep swab 2 days ago.  We repeated today and it was negative again.  Pt had remote syphillis infection with similar urethral symptoms that was treated.    PT will be travelling to [place] for 2 weeks later this afternoon      Problem List:     Patient Active Problem List   Diagnosis    On pre-exposure prophylaxis for HIV    Androgenic alopecia    History of Fever blisters    STD (male)         Medications:       Current Outpatient Medications:     clotrimazole (LOTRIMIN) 1 % cream, Apply topically 2 (two) times daily., Disp: , Rfl:     clotrimazole-betamethasone 1-0.05% (LOTRISONE) cream, Apply topically 2 (two) times daily., Disp: , Rfl:     doxycycline  (VIBRA-TABS) 100 MG tablet, Take 1 tablet (100 mg total) by mouth 2 (two) times daily., Disp: 14 tablet, Rfl: 0    emtricitabine-tenofovir 200-300 mg (TRUVADA) 200-300 mg Tab, Take 1 tablet by mouth once daily., Disp: 30 tablet, Rfl: 3    finasteride (PROPECIA) 1 mg tablet, Take 1 mg by mouth., Disp: , Rfl:     valACYclovir (VALTREX) 1000 MG tablet, Take 2 tablets (2,000 mg total) by mouth 2 (two) times daily as needed (as needed for fever blister)., Disp: 30 tablet, Rfl: 2    Current Facility-Administered Medications:     penicillin G benzathine (BICILLIN LA) injection 2.4 Million Units, 2.4 Million Units, Intramuscular, 1 time in Clinic/HOD, Alyse Crabtree MD        Review of Systems:     Review of Systems   Constitutional:  Negative for chills and fever.   HENT:  Negative for ear pain and sore throat.    Respiratory:  Negative for cough, shortness of breath and wheezing.    Cardiovascular:  Negative for chest pain and palpitations.   Gastrointestinal:  Negative for constipation, diarrhea, nausea and vomiting.   Genitourinary:  Negative for dysuria and hematuria.   Musculoskeletal:  Negative for joint swelling and joint deformity.   Neurological:  Negative for dizziness and weakness.         Physical Exam:     Temp:             Blood Pressure:  126/76        Pulse:   67     Respirations:  18  Weight:   80.4 kg (177 lb 4 oz)  Height:   6' (1.829 m)  BMI:     Body mass index is 24.04 kg/m².    Physical Exam  Constitutional:       General: He is not in acute distress.  HENT:      Right Ear: Tympanic membrane normal.      Left Ear: Tympanic membrane normal.      Nose: No congestion or rhinorrhea.      Mouth/Throat:      Pharynx: Posterior oropharyngeal erythema present. No oropharyngeal exudate.      Comments: The white tongue coating is almost completely resolved now.  Cardiovascular:      Rate and Rhythm: Normal rate and regular rhythm.   Pulmonary:      Effort: Pulmonary effort is normal. No respiratory  distress.      Breath sounds: No wheezing.   Abdominal:      Palpations: Abdomen is soft.      Tenderness: There is no abdominal tenderness.   Genitourinary:     Comments: There are small pinpoint flesh colored bumps on the shaft of penis just proximal to glans.    Lymphadenopathy:      Cervical: Cervical adenopathy present.   Skin:     General: Skin is warm.   Neurological:      General: No focal deficit present.      Mental Status: He is alert and oriented to person, place, and time.         Labs/Imaging/Testing:     Most recent CBC:  Lab Results   Component Value Date    WBC 6.86 03/07/2023    HGB 15.1 03/07/2023     03/07/2023    MCV 96 03/07/2023       Most recent Lipids:  Lab Results   Component Value Date    CHOL 171 03/07/2023    HDL 51 03/07/2023    LDLCALC 109.8 03/07/2023    TRIG 51 03/07/2023       Most recent Chemistry:  Lab Results   Component Value Date     03/07/2023    K 5.0 03/07/2023     03/07/2023    CO2 27 03/07/2023    BUN 18 03/07/2023    CREATININE 1.2 03/07/2023     03/07/2023    CALCIUM 9.3 03/07/2023    ALT 17 03/07/2023    AST 33 03/07/2023    ALKPHOS 61 03/07/2023    PROT 7.2 03/07/2023    ALBUMIN 4.4 03/07/2023       Other tests:  Lab Results   Component Value Date    TSH 0.941 03/07/2023    HGBA1C 5.1 03/07/2023         Assessment and Plan:     1. Sore throat  - POCT Strep A, Molecular     Rapid strep again negative today.  Oral GC has been negative as well.  RPR also negative.    Pt had improvement in symptoms after the penicillin G and ceftriaxone.  Unsure of diagnosis  Encouraged to continue the doxycycline until complete (did discuss whetehr the sore throat could be esophagitis related to the doxycycline but symptoms seem too high, tonsils do seem enlarged/red on exam).  We opted to give rocehpin 1 g im x 1.  Encouraged to follow up if no improvement in symptoms and may consdier ID referral for evaluation    The pinpoint lesions on shaft do not look like  herpetic lesions - possibly HPV?  Pt has been vaccinated.         Yuriy Sinclair MD  3/10/2023    This note was prepared using voice-recognition software.  Although efforts are made to proofread the note, some errors may persist in the final document.

## 2023-03-23 ENCOUNTER — PATIENT MESSAGE (OUTPATIENT)
Dept: PRIMARY CARE CLINIC | Facility: CLINIC | Age: 43
End: 2023-03-23
Payer: OTHER GOVERNMENT

## 2023-09-21 ENCOUNTER — PATIENT MESSAGE (OUTPATIENT)
Dept: PRIMARY CARE CLINIC | Facility: CLINIC | Age: 43
End: 2023-09-21
Payer: OTHER GOVERNMENT

## 2023-09-21 DIAGNOSIS — L64.9 ANDROGENIC ALOPECIA: Primary | ICD-10-CM

## 2023-09-21 RX ORDER — FINASTERIDE 1 MG/1
1 TABLET, FILM COATED ORAL DAILY
Qty: 30 TABLET | Refills: 3 | Status: SHIPPED | OUTPATIENT
Start: 2023-09-21

## 2023-09-21 NOTE — TELEPHONE ENCOUNTER
No care due was identified.  [facility] Embedded Care Due Messages. Reference number: [id number].   9/21/2023 8:24:25 AM CDT

## 2023-11-11 ENCOUNTER — OFFICE VISIT (OUTPATIENT)
Dept: URGENT CARE | Facility: CLINIC | Age: 43
End: 2023-11-11
Payer: OTHER GOVERNMENT

## 2023-11-11 VITALS
HEIGHT: 72 IN | HEART RATE: 58 BPM | DIASTOLIC BLOOD PRESSURE: 82 MMHG | WEIGHT: 175 LBS | TEMPERATURE: 98 F | OXYGEN SATURATION: 100 % | RESPIRATION RATE: 16 BRPM | SYSTOLIC BLOOD PRESSURE: 118 MMHG | BODY MASS INDEX: 23.7 KG/M2

## 2023-11-11 DIAGNOSIS — Z11.3 SCREENING EXAMINATION FOR STD (SEXUALLY TRANSMITTED DISEASE): ICD-10-CM

## 2023-11-11 DIAGNOSIS — R30.0 DYSURIA: ICD-10-CM

## 2023-11-11 DIAGNOSIS — N34.2 URETHRITIS: Primary | ICD-10-CM

## 2023-11-11 LAB
BILIRUB UR QL STRIP: NEGATIVE
GLUCOSE UR QL STRIP: NEGATIVE
HIV 1+2 AB+HIV1 P24 AG SERPL QL IA: NORMAL
KETONES UR QL STRIP: NEGATIVE
LEUKOCYTE ESTERASE UR QL STRIP: NEGATIVE
PH, POC UA: 5 (ref 5–8)
POC BLOOD, URINE: NEGATIVE
POC NITRATES, URINE: NEGATIVE
PROT UR QL STRIP: NEGATIVE
SP GR UR STRIP: 1.01 (ref 1–1.03)
UROBILINOGEN UR STRIP-ACNC: NORMAL (ref 0.3–2.2)

## 2023-11-11 PROCEDURE — 99214 OFFICE O/P EST MOD 30 MIN: CPT | Mod: S$GLB,,,

## 2023-11-11 PROCEDURE — 87491 CHLMYD TRACH DNA AMP PROBE: CPT

## 2023-11-11 PROCEDURE — 87389 HIV-1 AG W/HIV-1&-2 AB AG IA: CPT

## 2023-11-11 PROCEDURE — 87086 URINE CULTURE/COLONY COUNT: CPT

## 2023-11-11 PROCEDURE — 81003 POCT URINALYSIS, DIPSTICK, AUTOMATED, W/O SCOPE: ICD-10-PCS | Mod: QW,S$GLB,,

## 2023-11-11 PROCEDURE — 81003 URINALYSIS AUTO W/O SCOPE: CPT | Mod: QW,S$GLB,,

## 2023-11-11 PROCEDURE — 99214 PR OFFICE/OUTPT VISIT, EST, LEVL IV, 30-39 MIN: ICD-10-PCS | Mod: S$GLB,,,

## 2023-11-11 PROCEDURE — 86592 SYPHILIS TEST NON-TREP QUAL: CPT

## 2023-11-11 RX ORDER — PHENAZOPYRIDINE HYDROCHLORIDE 200 MG/1
200 TABLET, FILM COATED ORAL 3 TIMES DAILY PRN
Qty: 6 TABLET | Refills: 0 | Status: SHIPPED | OUTPATIENT
Start: 2023-11-11 | End: 2023-11-13

## 2023-11-11 NOTE — PROGRESS NOTES
"Subjective:      Patient ID: Jono Cuello is a 43 y.o. male.    Vitals:  height is 6' (1.829 m) and weight is 79.4 kg (175 lb). His oral temperature is 97.9 °F (36.6 °C). His blood pressure is 118/82 and his pulse is 58 (abnormal). His respiration is 16 and oxygen saturation is 100%.     Chief Complaint: STD CHECK    43 y.o male states that he has been having dysuria. He states that his symptoms started 2 days ago. He is also requesting std testing. He has not tried anything for his symptoms. He denies fever, chills, CP, SOB, nausea, vomiting, abdominal pain, penile discharge, testicular pain, hematuria, flank pain. Patient states that he has not been sexually active for at least a month. Patient states that he "does not want to take any medications until he knows what is going on".    Dysuria   This is a new problem. The current episode started in the past 7 days. The problem has been unchanged. The quality of the pain is described as burning. There has been no fever. Sexually active: not within the last month. There is No history of pyelonephritis. Pertinent negatives include no behavior changes, chills, discharge, flank pain, frequency, hematuria, hesitancy, nausea, possible pregnancy, sweats, urgency, vomiting, weight loss, bubble bath use, constipation, rash or withholding. He has tried nothing for the symptoms. The treatment provided no relief.       Constitution: Negative for chills, sweating, fatigue and fever.   HENT:  Negative for congestion and sore throat.    Neck: Negative for neck pain.   Cardiovascular:  Negative for chest pain and sob on exertion.   Respiratory:  Negative for cough and shortness of breath.    Gastrointestinal:  Negative for abdominal pain, nausea, vomiting, constipation and diarrhea.   Genitourinary:  Positive for dysuria. Negative for frequency, urgency, flank pain, hematuria, genital sore, penile discharge, painful ejaculation, penile pain, penile swelling, scrotal swelling, " testicular pain and pelvic pain.   Musculoskeletal:  Negative for joint pain, joint swelling and muscle ache.   Skin:  Negative for rash.   Neurological:  Negative for history of vertigo, headaches and numbness.      Objective:     Physical Exam   Constitutional:  Non-toxic appearance. He does not appear ill. No distress.      Comments:Patient is in no acute distress, patient is non-toxic appearing, patient is ox3, patient is answering question appropriately.   normal  Cardiovascular: Normal rate, regular rhythm and normal heart sounds.   No murmur heard.Exam reveals no gallop and no friction rub.   Pulmonary/Chest: Effort normal and breath sounds normal. No stridor. No respiratory distress. He has no wheezes. He has no rhonchi. He has no rales. He exhibits no tenderness.         Comments: Patient is in no respiratory distress. Breath sounds even, unlabored, and clear to auscultation bilaterally. No accessory muscle usage. Patient able to speak in complete sentences with ease.    Abdominal: Normal appearance and bowel sounds are normal. He exhibits no distension and no mass. Soft. There is no abdominal tenderness. There is no rebound, no guarding, no left CVA tenderness and no right CVA tenderness. No hernia.   Genitourinary:         Comments: Deferred by patient.     Neurological: He is alert.   Skin: Skin is not diaphoretic.   Nursing note and vitals reviewed.    Results for orders placed or performed in visit on 11/11/23   POCT Urinalysis, Dipstick, Automated, W/O Scope   Result Value Ref Range    POC Blood, Urine Negative Negative    POC Bilirubin, Urine Negative Negative    POC Urobilinogen, Urine normal 0.3 - 2.2    POC Ketones, Urine Negative Negative    POC Protein, Urine Negative Negative    POC Nitrates, Urine Negative Negative    POC Glucose, Urine Negative Negative    pH, UA 5.0 5 - 8    POC Specific Gravity, Urine 1.015 1.003 - 1.029    POC Leukocytes, Urine Negative Negative     Assessment:     1.  Urethritis    2. Dysuria    3. Screening examination for STD (sexually transmitted disease)      Plan:   Previous notes reviewed.  Vital signs reviewed.  Labs ordered. Labs reviewed.  Discussed Urethritis, home care, tx options, and given follow up precautions.  Patient was briefed on my thought process and diagnosis.   Patient involved with the treatment plan and agreed to the plan.  Patient informed on warning signs, patient understood warning signs and to go to urgent care or ER if warning signs appear.  Please excuse grammatical/spelling errors appreciated throughout this visit encounter for a remote dictation device was used during this encounter.    Patient Instructions   Please return here or go to the Emergency Department for any concerns or worsening of condition.    Someone will contact you with your results from your urine culture results and any potential changes to your treatment plan.    Please use protection such as condom usage when having sexual intercourse.  Please encourage your partner to get tested for STI.  Please do not engage in sexual intercourse while taking your medication for STIs.   Someone will contact you with your results from your STI screening and any potential changes to your treatment plan.    Please take PYRIDIUM (PHENAZOPYRIDINE) for burning sensation with urination. Please be aware that if you wear contact lens that this medication may stain your contacts. While taking this medication it is recommended that you do not wear your contacts until 24 hours after your last dose.    Please follow up with your primary care doctor or specialist as needed.    If you  smoke, please stop smoking.      Urethritis  -     phenazopyridine (PYRIDIUM) 200 MG tablet; Take 1 tablet (200 mg total) by mouth 3 (three) times daily as needed for Pain.  Dispense: 6 tablet; Refill: 0    Dysuria  -     POCT Urinalysis, Dipstick, Automated, W/O Scope  -     Urine culture  -     C. trachomatis/N.  gonorrhoeae by AMP DNA [facility]; Urine    Screening examination for STD (sexually transmitted disease)  -     HIV 1/2 Ag/Ab (4th Gen)  -     RPR      Terrie Teran PA-C

## 2023-11-11 NOTE — PATIENT INSTRUCTIONS
Please return here or go to the Emergency Department for any concerns or worsening of condition.    Someone will contact you with your results from your urine culture results and any potential changes to your treatment plan.    Please use protection such as condom usage when having sexual intercourse.  Please encourage your partner to get tested for STI.  Please do not engage in sexual intercourse while taking your medication for STIs.   Someone will contact you with your results from your STI screening and any potential changes to your treatment plan.    Please take PYRIDIUM (PHENAZOPYRIDINE) for burning sensation with urination. Please be aware that if you wear contact lens that this medication may stain your contacts. While taking this medication it is recommended that you do not wear your contacts until 24 hours after your last dose.    Please follow up with your primary care doctor or specialist as needed.    If you  smoke, please stop smoking.

## 2023-11-12 LAB — BACTERIA UR CULT: NO GROWTH

## 2023-11-13 LAB — RPR SER QL: NORMAL

## 2023-11-16 LAB
C TRACH DNA SPEC QL NAA+PROBE: NOT DETECTED
N GONORRHOEA DNA SPEC QL NAA+PROBE: NOT DETECTED

## 2024-03-18 ENCOUNTER — PATIENT MESSAGE (OUTPATIENT)
Dept: ADMINISTRATIVE | Facility: HOSPITAL | Age: 44
End: 2024-03-18
Payer: OTHER GOVERNMENT

## 2024-03-28 ENCOUNTER — PATIENT MESSAGE (OUTPATIENT)
Dept: ADMINISTRATIVE | Facility: HOSPITAL | Age: 44
End: 2024-03-28
Payer: OTHER GOVERNMENT

## 2024-03-28 ENCOUNTER — PATIENT OUTREACH (OUTPATIENT)
Dept: ADMINISTRATIVE | Facility: HOSPITAL | Age: 44
End: 2024-03-28
Payer: OTHER GOVERNMENT

## 2024-03-29 DIAGNOSIS — Z12.11 COLON CANCER SCREENING: Primary | ICD-10-CM

## 2024-04-28 NOTE — PROGRESS NOTES
[facility] Progress Note  5/2/2024          Reason for Visit:      had concerns including Annual Exam.       History of Present Illness:     Pt is here today for a check up.    At last visit, was having problems with dysuri after possible exposure to syphillis.  Despite negative testing, symptoms persisted.  Eventually was tx with doxycycline, Pen G and ceftrixone and had improvement.    Prep  Previously was on Truvada for prep.  Was in monogamous relationship for a whiel and stopped it, but no longer in releationship and wants to restart prep.  Prefers to use descovy.  Agreeable to use baseline labs today.         Androgenic alopecia  Finasteride 1 mg daily  Stable, no side effects.       Pt interested in routine labs today including STI screening  Already has appt with endoscopy  for colonsocopy       Problem List:     Patient Active Problem List   Diagnosis    On pre-exposure prophylaxis for HIV    Androgenic alopecia    History of Fever blisters    STD (male)         Medications:       Current Outpatient Medications:     finasteride (PROPECIA) 1 mg tablet, Take 1 tablet (1 mg total) by mouth once daily., Disp: 30 tablet, Rfl: 3    emtricitabine-tenofovir alafen (DESCOVY) 200-25 mg Tab, Take 1 tablet by mouth once daily., Disp: 30 tablet, Rfl: 3    triamcinolone acetonide 0.1% (KENALOG) 0.1 % cream, Apply topically 2 (two) times daily., Disp: 15 g, Rfl: 3    valACYclovir (VALTREX) 1000 MG tablet, Take 2 tablets (2,000 mg total) by mouth 2 (two) times daily as needed (as needed for fever blister)., Disp: 30 tablet, Rfl: 2  No current facility-administered medications for this visit.        Review of Systems:     Review of Systems   Constitutional:  Negative for activity change and unexpected weight change.   HENT:  Negative for hearing loss, rhinorrhea and trouble swallowing.    Eyes:  Negative for discharge and visual disturbance.   Respiratory:  Negative for chest tightness and  wheezing.    Cardiovascular:  Negative for chest pain and palpitations.   Gastrointestinal:  Negative for blood in stool, constipation, diarrhea and vomiting.   Endocrine: Negative for polydipsia and polyuria.   Genitourinary:  Negative for difficulty urinating, hematuria and urgency.   Musculoskeletal:  Negative for arthralgias, joint swelling and neck pain.   Neurological:  Negative for weakness and headaches.   Psychiatric/Behavioral:  Negative for confusion and dysphoric mood.            Physical Exam:     Temp:             Blood Pressure:  116/78        Pulse:   62     Respirations:  16  Weight:   77.2 kg (170 lb 3.1 oz)  Height:   6' (1.829 m)  BMI:     Body mass index is 23.08 kg/m².    Physical Exam  Constitutional:       General: He is not in acute distress.  HENT:      Right Ear: Tympanic membrane normal.      Left Ear: Tympanic membrane normal.      Nose: No congestion or rhinorrhea.      Mouth/Throat:      Pharynx: No oropharyngeal exudate or posterior oropharyngeal erythema.   Cardiovascular:      Rate and Rhythm: Normal rate and regular rhythm.   Pulmonary:      Effort: Pulmonary effort is normal. No respiratory distress.      Breath sounds: No wheezing.   Abdominal:      Palpations: Abdomen is soft.      Tenderness: There is no abdominal tenderness.   Skin:     General: Skin is warm.      Comments: Small scaled area in left parietal scalp.  No bleeding or redness noted.   Neurological:      General: No focal deficit present.      Mental Status: He is alert and oriented to person, place, and time.       Labs/Imaging/Testing:     Most recent CBC:  Lab Results   Component Value Date    WBC 6.86 03/07/2023    HGB 15.1 03/07/2023     03/07/2023    MCV 96 03/07/2023       Most recent Lipids:  Lab Results   Component Value Date    CHOL 171 03/07/2023    HDL 51 03/07/2023    LDLCALC 109.8 03/07/2023    TRIG 51 03/07/2023       Most recent Chemistry:  Lab Results   Component Value Date      03/07/2023    K 5.0 03/07/2023     03/07/2023    CO2 27 03/07/2023    BUN 18 03/07/2023    CREATININE 1.2 03/07/2023     03/07/2023    CALCIUM 9.3 03/07/2023    ALT 17 03/07/2023    AST 33 03/07/2023    ALKPHOS 61 03/07/2023    PROT 7.2 03/07/2023    ALBUMIN 4.4 03/07/2023       Other tests:  Lab Results   Component Value Date    TSH 0.941 03/07/2023    HGBA1C 5.1 03/07/2023       Assessment and Plan:     1. Well adult exam  - CBC Auto Differential; Future  - Comprehensive Metabolic Panel; Future  - Lipid Panel; Future  - Hemoglobin A1C; Future  - TSH; Future  - HIV 1/2 Ag/Ab (4th Gen); Future  - C. trachomatis/N. gonorrhoeae by AMP DNA; Future  - Treponema Pallidium Antibodies IgG, IgM; Future  - Trichomonas vaginalis, RNA, Qual, Urine; Future    2. Androgenic alopecia  Continue finasteride.    3. Screening examination for STD (sexually transmitted disease)  - HIV 1/2 Ag/Ab (4th Gen); Future  - C. trachomatis/N. gonorrhoeae by AMP DNA; Future  - Treponema Pallidium Antibodies IgG, IgM; Future  - Trichomonas vaginalis, RNA, Qual, Urine; Future    4. Encounter for HIV pre-exposure prophylaxis  - emtricitabine-tenofovir alafen (DESCOVY) 200-25 mg Tab; Take 1 tablet by mouth once daily.  Dispense: 30 tablet; Refill: 3   RTC 3 mos or sooner mulugeta Sinclair MD  5/2/2024    This note was prepared using voice-recognition software.  Although efforts are made to proofread the note, some errors may persist in the final document.

## 2024-05-02 ENCOUNTER — OFFICE VISIT (OUTPATIENT)
Dept: PRIMARY CARE CLINIC | Facility: CLINIC | Age: 44
End: 2024-05-02
Payer: OTHER GOVERNMENT

## 2024-05-02 ENCOUNTER — LAB VISIT (OUTPATIENT)
Dept: LAB | Facility: HOSPITAL | Age: 44
End: 2024-05-02
Attending: INTERNAL MEDICINE
Payer: OTHER GOVERNMENT

## 2024-05-02 VITALS
SYSTOLIC BLOOD PRESSURE: 116 MMHG | DIASTOLIC BLOOD PRESSURE: 78 MMHG | OXYGEN SATURATION: 98 % | HEART RATE: 62 BPM | WEIGHT: 170.19 LBS | BODY MASS INDEX: 23.05 KG/M2 | HEIGHT: 72 IN | RESPIRATION RATE: 16 BRPM

## 2024-05-02 DIAGNOSIS — Z00.00 WELL ADULT EXAM: Primary | ICD-10-CM

## 2024-05-02 DIAGNOSIS — Z11.3 SCREENING EXAMINATION FOR STD (SEXUALLY TRANSMITTED DISEASE): ICD-10-CM

## 2024-05-02 DIAGNOSIS — Z29.81 ENCOUNTER FOR HIV PRE-EXPOSURE PROPHYLAXIS: ICD-10-CM

## 2024-05-02 DIAGNOSIS — L64.9 ANDROGENIC ALOPECIA: ICD-10-CM

## 2024-05-02 DIAGNOSIS — Z00.00 WELL ADULT EXAM: ICD-10-CM

## 2024-05-02 LAB
ALBUMIN SERPL BCP-MCNC: 4.3 G/DL (ref 3.5–5.2)
ALP SERPL-CCNC: 53 U/L (ref 55–135)
ALT SERPL W/O P-5'-P-CCNC: 19 U/L (ref 10–44)
ANION GAP SERPL CALC-SCNC: 7 MMOL/L (ref 8–16)
AST SERPL-CCNC: 22 U/L (ref 10–40)
BASOPHILS # BLD AUTO: 0.04 K/UL (ref 0–0.2)
BASOPHILS NFR BLD: 0.6 % (ref 0–1.9)
BILIRUB SERPL-MCNC: 0.6 MG/DL (ref 0.1–1)
BUN SERPL-MCNC: 20 MG/DL (ref 6–20)
CALCIUM SERPL-MCNC: 9.9 MG/DL (ref 8.7–10.5)
CHLORIDE SERPL-SCNC: 105 MMOL/L (ref 95–110)
CHOLEST SERPL-MCNC: 155 MG/DL (ref 120–199)
CHOLEST/HDLC SERPL: 2.9 {RATIO} (ref 2–5)
CO2 SERPL-SCNC: 25 MMOL/L (ref 23–29)
CREAT SERPL-MCNC: 1.3 MG/DL (ref 0.5–1.4)
DIFFERENTIAL METHOD BLD: ABNORMAL
EOSINOPHIL # BLD AUTO: 0 K/UL (ref 0–0.5)
EOSINOPHIL NFR BLD: 0.6 % (ref 0–8)
ERYTHROCYTE [DISTWIDTH] IN BLOOD BY AUTOMATED COUNT: 12.3 % (ref 11.5–14.5)
EST. GFR  (NO RACE VARIABLE): >60 ML/MIN/1.73 M^2
ESTIMATED AVG GLUCOSE: 97 MG/DL (ref 68–131)
GLUCOSE SERPL-MCNC: 83 MG/DL (ref 70–110)
HBA1C MFR BLD: 5 % (ref 4–5.6)
HCT VFR BLD AUTO: 42.5 % (ref 40–54)
HDLC SERPL-MCNC: 53 MG/DL (ref 40–75)
HDLC SERPL: 34.2 % (ref 20–50)
HGB BLD-MCNC: 14.7 G/DL (ref 14–18)
HIV 1+2 AB+HIV1 P24 AG SERPL QL IA: NORMAL
IMM GRANULOCYTES # BLD AUTO: 0.02 K/UL (ref 0–0.04)
IMM GRANULOCYTES NFR BLD AUTO: 0.3 % (ref 0–0.5)
LDLC SERPL CALC-MCNC: 89.6 MG/DL (ref 63–159)
LYMPHOCYTES # BLD AUTO: 1.8 K/UL (ref 1–4.8)
LYMPHOCYTES NFR BLD: 28 % (ref 18–48)
MCH RBC QN AUTO: 32.5 PG (ref 27–31)
MCHC RBC AUTO-ENTMCNC: 34.6 G/DL (ref 32–36)
MCV RBC AUTO: 94 FL (ref 82–98)
MONOCYTES # BLD AUTO: 0.4 K/UL (ref 0.3–1)
MONOCYTES NFR BLD: 6.6 % (ref 4–15)
NEUTROPHILS # BLD AUTO: 4.1 K/UL (ref 1.8–7.7)
NEUTROPHILS NFR BLD: 63.9 % (ref 38–73)
NONHDLC SERPL-MCNC: 102 MG/DL
NRBC BLD-RTO: 0 /100 WBC
PLATELET # BLD AUTO: 228 K/UL (ref 150–450)
PMV BLD AUTO: 11.2 FL (ref 9.2–12.9)
POTASSIUM SERPL-SCNC: 5.2 MMOL/L (ref 3.5–5.1)
PROT SERPL-MCNC: 7.1 G/DL (ref 6–8.4)
RBC # BLD AUTO: 4.52 M/UL (ref 4.6–6.2)
SODIUM SERPL-SCNC: 137 MMOL/L (ref 136–145)
TREPONEMA PALLIDUM IGG+IGM AB [PRESENCE] IN SERUM OR PLASMA BY IMMUNOASSAY: REACTIVE
TRIGL SERPL-MCNC: 62 MG/DL (ref 30–150)
TSH SERPL DL<=0.005 MIU/L-ACNC: 1.37 UIU/ML (ref 0.4–4)
WBC # BLD AUTO: 6.39 K/UL (ref 3.9–12.7)

## 2024-05-02 PROCEDURE — 84443 ASSAY THYROID STIM HORMONE: CPT | Performed by: INTERNAL MEDICINE

## 2024-05-02 PROCEDURE — 36415 COLL VENOUS BLD VENIPUNCTURE: CPT | Mod: PN | Performed by: INTERNAL MEDICINE

## 2024-05-02 PROCEDURE — 86593 SYPHILIS TEST NON-TREP QUANT: CPT | Performed by: INTERNAL MEDICINE

## 2024-05-02 PROCEDURE — 87661 TRICHOMONAS VAGINALIS AMPLIF: CPT | Performed by: INTERNAL MEDICINE

## 2024-05-02 PROCEDURE — 99214 OFFICE O/P EST MOD 30 MIN: CPT | Mod: PBBFAC,PN | Performed by: INTERNAL MEDICINE

## 2024-05-02 PROCEDURE — 99999 PR PBB SHADOW E&M-EST. PATIENT-LVL IV: CPT | Mod: PBBFAC,,, | Performed by: INTERNAL MEDICINE

## 2024-05-02 PROCEDURE — 86593 SYPHILIS TEST NON-TREP QUANT: CPT | Mod: 91 | Performed by: INTERNAL MEDICINE

## 2024-05-02 PROCEDURE — 99396 PREV VISIT EST AGE 40-64: CPT | Mod: S$PBB,,, | Performed by: INTERNAL MEDICINE

## 2024-05-02 PROCEDURE — 87389 HIV-1 AG W/HIV-1&-2 AB AG IA: CPT | Performed by: INTERNAL MEDICINE

## 2024-05-02 PROCEDURE — 80053 COMPREHEN METABOLIC PANEL: CPT | Performed by: INTERNAL MEDICINE

## 2024-05-02 PROCEDURE — 86592 SYPHILIS TEST NON-TREP QUAL: CPT | Performed by: INTERNAL MEDICINE

## 2024-05-02 PROCEDURE — 83036 HEMOGLOBIN GLYCOSYLATED A1C: CPT | Performed by: INTERNAL MEDICINE

## 2024-05-02 PROCEDURE — 85025 COMPLETE CBC W/AUTO DIFF WBC: CPT | Performed by: INTERNAL MEDICINE

## 2024-05-02 PROCEDURE — 80061 LIPID PANEL: CPT | Performed by: INTERNAL MEDICINE

## 2024-05-02 RX ORDER — TRIAMCINOLONE ACETONIDE 1 MG/G
CREAM TOPICAL 2 TIMES DAILY
Qty: 15 G | Refills: 3 | Status: SHIPPED | OUTPATIENT
Start: 2024-05-02

## 2024-05-02 RX ORDER — EMTRICITABINE AND TENOFOVIR ALAFENAMIDE 200; 25 MG/1; MG/1
1 TABLET ORAL DAILY
Qty: 30 TABLET | Refills: 3 | Status: SHIPPED | OUTPATIENT
Start: 2024-05-02

## 2024-05-03 ENCOUNTER — PATIENT MESSAGE (OUTPATIENT)
Dept: PRIMARY CARE CLINIC | Facility: CLINIC | Age: 44
End: 2024-05-03
Payer: OTHER GOVERNMENT

## 2024-05-03 DIAGNOSIS — R89.9 ABNORMAL LABORATORY TEST RESULT: Primary | ICD-10-CM

## 2024-05-03 LAB
RPR SER QL: REACTIVE
RPR SER-TITR: ABNORMAL {TITER}

## 2024-05-03 NOTE — PROGRESS NOTES
[facility] Progress Note  5/6/2024    This was a virtual visit conducted via webcam.      Reason for Visit:      is following up on STI screening/ Positive RPR  Time spent on visit today: 10 minutes    History of Present Illness:     Pt is seen today to discuss + RPR (1:1) titer on recent labs done for STI screening.    Pt reports previous history of syphilis treated about 5-6 years ago in [place].  He was symptomatic at the time (Rash)  I can see a positive RPR 3/25/2019 at a 1:1 titer, and negative RPR on 6/9/20, 11/3/20 and 3/9/21.    He has subsequently has negative RPRs on 6/3/21, 3/6/23 and 11/11/23 here at [facility].    He was asymptomatic for recent labs done for screening and reinitiating PREP.  The RPR returned positive at a 1:1 titer.    Problem List:     Problem List:  2023-03: STD (male)  2022-10: Androgenic alopecia  2022-10: History of Fever blisters  2021-07: On pre-exposure prophylaxis for HIV        Medications:       Current Outpatient Medications:     emtricitabine-tenofovir alafen (DESCOVY) 200-25 mg Tab, Take 1 tablet by mouth once daily., Disp: 30 tablet, Rfl: 3    finasteride (PROPECIA) 1 mg tablet, Take 1 tablet (1 mg total) by mouth once daily., Disp: 30 tablet, Rfl: 3    triamcinolone acetonide 0.1% (KENALOG) 0.1 % cream, Apply topically 2 (two) times daily., Disp: 15 g, Rfl: 3    valACYclovir (VALTREX) 1000 MG tablet, Take 2 tablets (2,000 mg total) by mouth 2 (two) times daily as needed (as needed for fever blister)., Disp: 30 tablet, Rfl: 2      Review of Systems:     Review of Systems   Constitutional:  Negative for activity change and unexpected weight change.   HENT:  Negative for hearing loss, rhinorrhea and trouble swallowing.    Eyes:  Negative for discharge and visual disturbance.   Respiratory:  Negative for chest tightness and wheezing.    Cardiovascular:  Negative for chest pain and palpitations.   Gastrointestinal:  Negative for blood in stool,  constipation, diarrhea and vomiting.   Endocrine: Negative for polydipsia and polyuria.   Genitourinary:  Negative for difficulty urinating, hematuria and urgency.   Musculoskeletal:  Negative for arthralgias, joint swelling and neck pain.   Neurological:  Negative for weakness and headaches.   Psychiatric/Behavioral:  Negative for confusion and dysphoric mood.            Physical Exam:     Pt is alert and oriented.  Speech is clear and coherent.  Speaking in complete sentences.  No distress.  Mood and affect are normal.      Labs/Imaging/Testing:           Assessment and Plan:     1. Positive RPR test  I advised patient that I am not 100% certain how to interpret this situation and would like to get assistance from ID to help guide management.  Given that the only previous positive titer I can see was 1:1, I think that he would need to have at least a 4:1 titer to be considered a new infection, but I'm not 100% sure since he has been negative on many between now and then.  We will help arrange referral to discuss with ID.     Yuriy Sinclair MD  5/6/2024    This note was prepared using voice-recognition software.  Although efforts are made to proofread the note, some errors may persist in the final document.    Dr. Sinclair's [place] License number is [id number]  This was a virtual (audio/video visit) in lieu of in-person visit in context of the coronavirus emergency.      Patient/Family members identity was confirmed, and confidentiality/privacy confirmed prior to visit. Verbal informed consent was obtained from the patient's legal guardian or patient when appropriate to conduct this virtual visit.  They authorized me to provide medical care and voiced understanding of the risks, benefits, and alternatives of virtual care.  Guardian understands the limitations inherent of a virtual visit, that they may choose to be seen in person if desired or needed, and that they may halt the virtual visit at any time for any reason.      Originating Site: Patient's home   Distant Site:  [facility]     I certify that this visit was done via secure two-way simultaneous audio and video transmission with informed consent of the patient and/or guardian. Over 50% of the time was counseling or coordinating care.

## 2024-05-03 NOTE — TELEPHONE ENCOUNTER
Pt requesting referral to ID and stated he called and was told a referral is needed to address abnormal Treponema and abnormal RPR. Referral pended.

## 2024-05-04 LAB
SPECIMEN SOURCE: NORMAL
T VAGINALIS RRNA SPEC QL NAA+PROBE: NEGATIVE

## 2024-05-06 ENCOUNTER — OFFICE VISIT (OUTPATIENT)
Dept: PRIMARY CARE CLINIC | Facility: CLINIC | Age: 44
End: 2024-05-06
Payer: OTHER GOVERNMENT

## 2024-05-06 DIAGNOSIS — A53.0 POSITIVE RPR TEST: Primary | ICD-10-CM

## 2024-05-06 PROCEDURE — 99213 OFFICE O/P EST LOW 20 MIN: CPT | Mod: 95,,, | Performed by: INTERNAL MEDICINE

## 2024-05-07 ENCOUNTER — OFFICE VISIT (OUTPATIENT)
Dept: INFECTIOUS DISEASES | Facility: CLINIC | Age: 44
End: 2024-05-07
Payer: OTHER GOVERNMENT

## 2024-05-07 VITALS
HEIGHT: 72 IN | DIASTOLIC BLOOD PRESSURE: 88 MMHG | TEMPERATURE: 98 F | SYSTOLIC BLOOD PRESSURE: 137 MMHG | WEIGHT: 172.38 LBS | BODY MASS INDEX: 23.35 KG/M2 | HEART RATE: 60 BPM

## 2024-05-07 DIAGNOSIS — R89.9 ABNORMAL LABORATORY TEST RESULT: ICD-10-CM

## 2024-05-07 PROCEDURE — 99999 PR PBB SHADOW E&M-EST. PATIENT-LVL III: CPT | Mod: PBBFAC,,, | Performed by: PHYSICIAN ASSISTANT

## 2024-05-07 PROCEDURE — 99203 OFFICE O/P NEW LOW 30 MIN: CPT | Mod: S$PBB,,, | Performed by: PHYSICIAN ASSISTANT

## 2024-05-07 PROCEDURE — 99213 OFFICE O/P EST LOW 20 MIN: CPT | Mod: PBBFAC | Performed by: PHYSICIAN ASSISTANT

## 2024-05-07 NOTE — PROGRESS NOTES
"Subjective     Patient ID: Jono Cuello is a 44 y.o. male.    Chief Complaint: Urinary Tract Infection    HPI    45 y/o male was referred to see me from PCP to discuss +RPR testing on recent lab testing for STI screening.  per chart review, " Pt reports previous history of syphilis treated about 5-6 years ago in [place].  He was symptomatic at the time (Rash) I can see a positive RPR 3/25/2019 at a 1:1 titer, and negative RPR on 6/9/20, 11/3/20 and 3/9/21.     He has subsequently has negative RPRs on 6/3/21, 3/6/23 and 11/11/23 here at [facility]."     Pt was restarted on PREP, managed by his PCP.  Repaet STI screening done, HIV negative, RPR 1:1. Pt remains asymptomatic.     Review of Systems   All other systems reviewed and are negative.         Objective     Physical Exam  Vitals and nursing note reviewed.   Constitutional:       Appearance: Normal appearance.   HENT:      Head: Normocephalic.   Eyes:      Pupils: Pupils are equal, round, and reactive to light.   Cardiovascular:      Rate and Rhythm: Normal rate.   Pulmonary:      Effort: Pulmonary effort is normal.   Musculoskeletal:         General: Normal range of motion.   Skin:     General: Skin is warm and dry.      Coloration: Skin is not jaundiced or pale.   Neurological:      Mental Status: He is oriented to person, place, and time.   Psychiatric:         Mood and Affect: Mood normal.            Assessment and Plan     1. Abnormal laboratory test result  -     Ambulatory referral/consult to Infectious Disease      Pt without active syphilis at this time. No treatment indicated. Pt considered to be in serofast state. A two-fourfold change in titer between two results with the same nontreponemal (lipoidal antigen) tests is considered clinically significant with an acute infection.     Recommend hep B screening. If negative for immunity recommend hep B vaccination  Recommend hep A vaccination as well      Case reviewed and discussed with ID staff       >35 " minutes of total time spent on the encounter, which includes face to face time and non-face to face time preparing to see the patient (eg, review of tests), Obtaining and/or reviewing separately obtained history, Documenting clinical information in the electronic or other health record, Independently interpreting results (not separately reported) and communicating results to the patient/family/caregiver, or Care coordination (not separately reported).            No follow-ups on file.

## 2024-06-10 ENCOUNTER — E-VISIT (OUTPATIENT)
Dept: PRIMARY CARE CLINIC | Facility: CLINIC | Age: 44
End: 2024-06-10
Payer: OTHER GOVERNMENT

## 2024-06-10 ENCOUNTER — CLINICAL SUPPORT (OUTPATIENT)
Dept: ENDOSCOPY | Facility: HOSPITAL | Age: 44
End: 2024-06-10
Attending: INTERNAL MEDICINE
Payer: OTHER GOVERNMENT

## 2024-06-10 VITALS — BODY MASS INDEX: 23.35 KG/M2 | WEIGHT: 172.38 LBS | HEIGHT: 72 IN

## 2024-06-10 DIAGNOSIS — R30.0 DYSURIA: ICD-10-CM

## 2024-06-10 DIAGNOSIS — Z12.11 COLON CANCER SCREENING: ICD-10-CM

## 2024-06-10 DIAGNOSIS — A64 STD (MALE): Primary | ICD-10-CM

## 2024-06-10 PROCEDURE — 99421 OL DIG E/M SVC 5-10 MIN: CPT | Mod: ,,, | Performed by: INTERNAL MEDICINE

## 2024-06-10 NOTE — PROGRESS NOTES
[facility] E-Visit Note      Reason for Visit:     Chief Complaint: Urinary Tract Infection (Entered automatically based on patient selection in Patient Portal.)    The patient initiated a request through [employer] on 6/10/2024 for evaluation and management with a chief complaint of Urinary Tract Infection (Entered automatically based on patient selection in Patient Portal.)     History of Present Illness:     I evaluated the questionnaire submission on 6/10/24.    [facility] Peq Evisit Uti Questionnaire    6/10/2024 10:55 AM CDT - Filed by Patient   Do you agree to participate in an E-Visit? Yes   If you have any of the following symptoms, please present to your local emergency room or call 911:  I acknowledge   What is the main issue you would like addressed today? Bit of discomfort in urethra. would like order to do urine screen for STI   What symptoms do you currently have? Pain while passing urine   When did your symptoms first appear? 6/10/2024   List what you have done or taken to help your symptoms. nothing   Please indicate whether you have had the following symptoms during the past 24 hours     Urgent urination (a sudden and uncontrollable urge to urinate) None   Frequent urination of small amounts of urine (going to the toilet very often) None   Burning pain when urinating Mild   Incomplete bladder emptying (still feel like you need to urinate again after urination) None   Pain not associated with urination in the lower abdomen below the belly button) Mild   What does your urine look like? Clear   Blood seen in the urine None   Flank pain (pain in one or both sides of the lower back) None   Discharge from the urethra (urinary opening) without urination None   High body temperature/fever? None-<99.5   Please rate how much discomfort you have experience because of the symptoms in the past 24 hours: Mild   Please indicate how the symptoms have interfered with your every day activities/work in the past 24  hours: None   Please indicate how these symptoms have interfered with your social activities (visiting people, meeting with friends, etc.) in the past 24 hours? None   Are you a diabetic? No   Provide any additional information you feel is important.    Please attach any relevant images or files (if you have performed a home test for UTI, please submit a photo of results)    Are you able to take your vital signs? Yes   Systolic Blood Pressure: 138   Diastolic Blood Pressure: 80   Weight: 172   Height: 72   Pulse: 56   Temperature: 97   Respiration rate:    Pulse Oxygen:           Problem List:     Patient Active Problem List   Diagnosis    On pre-exposure prophylaxis for HIV    Androgenic alopecia    History of Fever blisters    STD (male)         Medications:       Current Outpatient Medications:     emtricitabine-tenofovir alafen (DESCOVY) 200-25 mg Tab, Take 1 tablet by mouth once daily., Disp: 30 tablet, Rfl: 3    finasteride (PROPECIA) 1 mg tablet, Take 1 tablet (1 mg total) by mouth once daily., Disp: 30 tablet, Rfl: 3    triamcinolone acetonide 0.1% (KENALOG) 0.1 % cream, Apply topically 2 (two) times daily., Disp: 15 g, Rfl: 3    valACYclovir (VALTREX) 1000 MG tablet, Take 2 tablets (2,000 mg total) by mouth 2 (two) times daily as needed (as needed for fever blister)., Disp: 30 tablet, Rfl: 2      Labs/Imaging/Testing:     No visits with results within 7 Day(s) from this visit.   Latest known visit with results is:   Lab Visit on 05/02/2024   Component Date Value Ref Range Status    Chlamydia, Amplified DNA 05/02/2024 Not Detected  Not Detected Final    N gonorrhoeae, amplified DNA 05/02/2024 Not Detected  Not Detected Final         Assessment and Plan:     1. STD (male)  - C. trachomatis/N. gonorrhoeae by AMP DNA; Future  - HIV 1/2 Ag/Ab (4th Gen); Future  - Trichomonas vaginalis, RNA, Qual, Urine; Future  - Urinalysis; Future  - Urine culture; Future  - RPR (for monitoring); Future    2. Dysuria  - C.  trachomatis/N. gonorrhoeae by AMP DNA; Future  - HIV 1/2 Ag/Ab (4th Gen); Future  - Trichomonas vaginalis, RNA, Qual, Urine; Future  - Urinalysis; Future  - Urine culture; Future  - RPR (for monitoring); Future       E-Visit time Tracking     Day 1 Time (in minutes): 6    Total Time (in minutes): 6       Yuriy Sinclair MD  6/10/2024

## 2024-06-10 NOTE — PLAN OF CARE
Spoke to pt. Offered June 17 at [place] but pt. Unable to come that day. Pt. Given Scheduling Dept. Phone number and stated he will call back to schedule. Pt. Wants a September date.

## 2024-06-11 ENCOUNTER — LAB VISIT (OUTPATIENT)
Dept: LAB | Facility: HOSPITAL | Age: 44
End: 2024-06-11
Attending: INTERNAL MEDICINE
Payer: OTHER GOVERNMENT

## 2024-06-11 DIAGNOSIS — A64 STD (MALE): ICD-10-CM

## 2024-06-11 DIAGNOSIS — R30.0 DYSURIA: ICD-10-CM

## 2024-06-11 LAB — HIV 1+2 AB+HIV1 P24 AG SERPL QL IA: NORMAL

## 2024-06-11 PROCEDURE — 87661 TRICHOMONAS VAGINALIS AMPLIF: CPT | Performed by: INTERNAL MEDICINE

## 2024-06-11 PROCEDURE — 87389 HIV-1 AG W/HIV-1&-2 AB AG IA: CPT | Performed by: INTERNAL MEDICINE

## 2024-06-11 PROCEDURE — 86592 SYPHILIS TEST NON-TREP QUAL: CPT | Performed by: INTERNAL MEDICINE

## 2024-06-11 PROCEDURE — 36415 COLL VENOUS BLD VENIPUNCTURE: CPT | Mod: PN | Performed by: INTERNAL MEDICINE

## 2024-06-12 LAB
RPR SER QL: NORMAL
SPECIMEN SOURCE: NORMAL
T VAGINALIS RRNA SPEC QL NAA+PROBE: NEGATIVE

## 2024-07-17 RX ORDER — VALACYCLOVIR HYDROCHLORIDE 1 G/1
2000 TABLET, FILM COATED ORAL 2 TIMES DAILY PRN
Qty: 30 TABLET | Refills: 2 | Status: SHIPPED | OUTPATIENT
Start: 2024-07-17 | End: 2024-07-18

## 2024-07-17 NOTE — TELEPHONE ENCOUNTER
No care due was identified.  [facility] Embedded Care Due Messages. Reference number: [id number].   7/16/2024 9:08:37 PM CDT

## 2024-08-19 ENCOUNTER — E-VISIT (OUTPATIENT)
Dept: PRIMARY CARE CLINIC | Facility: CLINIC | Age: 44
End: 2024-08-19
Payer: OTHER GOVERNMENT

## 2024-08-19 ENCOUNTER — PATIENT MESSAGE (OUTPATIENT)
Dept: PRIMARY CARE CLINIC | Facility: CLINIC | Age: 44
End: 2024-08-19

## 2024-08-19 DIAGNOSIS — A64 STD (MALE): Primary | ICD-10-CM

## 2024-08-19 RX ORDER — AZITHROMYCIN 500 MG/1
1000 TABLET, FILM COATED ORAL DAILY
Qty: 2 TABLET | Refills: 0 | Status: SHIPPED | OUTPATIENT
Start: 2024-08-19

## 2024-08-19 NOTE — TELEPHONE ENCOUNTER
labs scheduled here at our [place] [facility] tomorrow @ 830 am. Also, scheduled  a nurse visit tomorrow for Rocephin injection right after labs.     Pt made aware via [employer] message thread)

## 2024-08-19 NOTE — PROGRESS NOTES
[facility]      Reason for Visit:     Chief Complaint: General Illness (Entered automatically based on patient selection in [employer].)    The patient initiated a request through [employer] on 8/19/2024 for evaluation and management with a chief complaint of General Illness (Entered automatically based on patient selection in [employer].)     History of Present Illness:     I evaluated the questionnaire submission on 8/19/24.    [facility]    8/19/2024 10:17 AM CDT - Filed by Patient   What do you need help with? Exposure to STD   Do you agree to participate in an E-Visit? Yes   If you have any of the symptoms below, please do not complete an E-Visit. Instead, go to your local emergency room: I acknowledge   I would like to address: STD Screening   During the last 2 months, have you had any new sexual partners? Yes   Do you feel you need to be tested for STDs? Yes   Do you have any of the following symptoms? Pain in penis   Provide any additional information you feel is important. Having some pain in urethra after new partner last week.   Please attach any relevant images or files    Are you able to take your vital signs? Yes   Systolic Blood Pressure: 128   Diastolic Blood Pressure: 78   Weight: 173   Height: 72   Pulse: 58   Temperature: 98   Respiration rate:    Pulse Oxygen:           Problem List:     Patient Active Problem List   Diagnosis    On pre-exposure prophylaxis for HIV    Androgenic alopecia    History of Fever blisters    STD (male)         Medications:       Current Outpatient Medications:     emtricitabine-tenofovir alafen (DESCOVY) 200-25 mg Tab, Take 1 tablet by mouth once daily., Disp: 30 tablet, Rfl: 3    finasteride (PROPECIA) 1 mg tablet, Take 1 tablet (1 mg total) by mouth once daily., Disp: 30 tablet, Rfl: 3    triamcinolone acetonide 0.1% (KENALOG) 0.1 % cream, Apply topically 2 (two) times daily., Disp: 15 g, Rfl: 3    valACYclovir (VALTREX) 1000  MG tablet, Take 2 tablets (2,000 mg total) by mouth 2 (two) times daily as needed (as needed for fever blister)., Disp: 30 tablet, Rfl: 2      Labs/Imaging/Testing:     No visits with results within 7 Day(s) from this visit.   Latest known visit with results is:   Lab Visit on 06/11/2024   Component Date Value Ref Range Status    Chlamydia, Amplified DNA 06/11/2024 Not Detected  Not Detected Final    N gonorrhoeae, amplified DNA 06/11/2024 Not Detected  Not Detected Final    Specimen UA 06/11/2024 Urine, Clean Catch   Final    Color, UA 06/11/2024 Yellow  Yellow, Straw, Casi Final    Appearance, UA 06/11/2024 Clear  Clear Final    pH, UA 06/11/2024 6.0  5.0 - 8.0 Final    Specific Gravity, UA 06/11/2024 1.020  1.005 - 1.030 Final    Protein, UA 06/11/2024 Trace (A)  Negative Final    Comment: Recommend a 24 hour urine protein or a urine   protein/creatinine ratio if globulin induced proteinuria is  clinically suspected.      Glucose, UA 06/11/2024 Negative  Negative Final    Ketones, UA 06/11/2024 Negative  Negative Final    Bilirubin (UA) 06/11/2024 Negative  Negative Final    Occult Blood UA 06/11/2024 Negative  Negative Final    Nitrite, UA 06/11/2024 Negative  Negative Final    Leukocytes, UA 06/11/2024 Negative  Negative Final    Urine Culture, Routine 06/11/2024 No growth   Final         Assessment and Plan:     1. STD (male)  - C. trachomatis/N. gonorrhoeae by AMP DNA; Future  - HIV 1/2 Ag/Ab (4th Gen); Future  - Trichomonas vaginalis, RNA, Qual, Urine; Future  - RPR (for monitoring); Future     Hi - I put in orders for sti screening and will request my staff reach out to you to try to set that up.  If you are having urethral pain, we could opt to treat empirically with medications now while awaiting results if you wanted.  Please let me know if you want to do that as well.  E-Visit time Tracking     Day 1 Time (in minutes): 6    Total Time (in minutes): 6       Yuriy Sinclair MD  8/19/2024

## 2024-08-20 ENCOUNTER — LAB VISIT (OUTPATIENT)
Dept: LAB | Facility: HOSPITAL | Age: 44
End: 2024-08-20
Attending: INTERNAL MEDICINE
Payer: OTHER GOVERNMENT

## 2024-08-20 ENCOUNTER — CLINICAL SUPPORT (OUTPATIENT)
Dept: PRIMARY CARE CLINIC | Facility: CLINIC | Age: 44
End: 2024-08-20
Payer: OTHER GOVERNMENT

## 2024-08-20 DIAGNOSIS — A64 STD (MALE): Primary | ICD-10-CM

## 2024-08-20 DIAGNOSIS — A64 STD (MALE): ICD-10-CM

## 2024-08-20 PROCEDURE — 96372 THER/PROPH/DIAG INJ SC/IM: CPT | Mod: PBBFAC,PN

## 2024-08-20 PROCEDURE — 99999PBSHW PR PBB SHADOW TECHNICAL ONLY FILED TO HB: Mod: PBBFAC,,,

## 2024-08-20 PROCEDURE — 86592 SYPHILIS TEST NON-TREP QUAL: CPT | Performed by: INTERNAL MEDICINE

## 2024-08-20 PROCEDURE — 87661 TRICHOMONAS VAGINALIS AMPLIF: CPT | Performed by: INTERNAL MEDICINE

## 2024-08-20 PROCEDURE — 99211 OFF/OP EST MAY X REQ PHY/QHP: CPT | Mod: PBBFAC,PN

## 2024-08-20 PROCEDURE — 99999 PR PBB SHADOW E&M-EST. PATIENT-LVL I: CPT | Mod: PBBFAC,,,

## 2024-08-20 PROCEDURE — 36415 COLL VENOUS BLD VENIPUNCTURE: CPT | Mod: PN | Performed by: INTERNAL MEDICINE

## 2024-08-20 PROCEDURE — 87389 HIV-1 AG W/HIV-1&-2 AB AG IA: CPT | Performed by: INTERNAL MEDICINE

## 2024-08-20 RX ORDER — CEFTRIAXONE 500 MG/1
500 INJECTION, POWDER, FOR SOLUTION INTRAMUSCULAR; INTRAVENOUS
Status: COMPLETED | OUTPATIENT
Start: 2024-08-20 | End: 2024-08-20

## 2024-08-20 RX ADMIN — CEFTRIAXONE SODIUM 500 MG: 500 INJECTION, POWDER, FOR SOLUTION INTRAMUSCULAR; INTRAVENOUS at 08:08

## 2024-08-20 NOTE — PROGRESS NOTES
Pt identified with name and , allergies reviewed,gave consent and was given Rocephin 500 mg shot per MD order. Tolerated well. Instructed to wait around for 15 minutes post administration.      
none

## 2024-08-21 LAB
HIV 1+2 AB+HIV1 P24 AG SERPL QL IA: NORMAL
RPR SER QL: NORMAL
SPECIMEN SOURCE: NORMAL
T VAGINALIS RRNA SPEC QL NAA+PROBE: NEGATIVE

## 2024-09-02 NOTE — PROGRESS NOTES
[facility] Progress Note  9/6/2024    This was a virtual visit conducted via webcam.      Reason for Visit:      is having problems with depressed mood  Time spent on visit today: 15 minutes    History of Present Illness:   This visit was initially started on a virtual visit via Web cam but was finished via phone because of technical difficulties    Depression  Patient reports depressive symptoms ongoing for a couple of months now.  He says that he lost a a relationship and he has had some struggles this summer with that.  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  He says he is doing therapy and working on efforts to try to mitigate these situations.  However he does feel like there is a chemical imbalance going on as well and would like to discuss pharmacotherapy for that.  He has never been treated for depression in the past.  He does report some anhedonia and some decreased appetite.  He says he has not eaten dinner most of the month.  He has had some suicidal ideation but has taken steps to make his home safe by having a friend take his gun.  He has not had any active plans.  He does feel safe at home currently.    Regarding his recent dysuria, patient has had repeated STI testing that has come back negative.  He thinks now that it is due to a genital wart that is near the tip of his urethra.  He has been applying topical medication to this currently and says he will continue to try that for now but we offered referral to Urology at any point if he feels like that is needed.    Problem List:     Problem List:  2023-03: STD (male)  2022-10: Androgenic alopecia  2022-10: History of Fever blisters  2021-07: On pre-exposure prophylaxis for HIV        Medications:       Current Outpatient Medications:     azithromycin (ZITHROMAX) 500 MG tablet, Take 2 tablets (1,000 mg total) by mouth once daily., Disp: 2 tablet, Rfl: 0    buPROPion  (WELLBUTRIN XL) 150 MG TB24 tablet, Take 1 tablet (150 mg total) by mouth once daily., Disp: 30 tablet, Rfl: 5    emtricitabine-tenofovir alafen (DESCOVY) 200-25 mg Tab, Take 1 tablet by mouth once daily., Disp: 30 tablet, Rfl: 3    finasteride (PROPECIA) 1 mg tablet, Take 1 tablet (1 mg total) by mouth once daily., Disp: 30 tablet, Rfl: 3    triamcinolone acetonide 0.1% (KENALOG) 0.1 % cream, Apply topically 2 (two) times daily., Disp: 15 g, Rfl: 3    valACYclovir (VALTREX) 1000 MG tablet, Take 2 tablets (2,000 mg total) by mouth 2 (two) times daily as needed (as needed for fever blister)., Disp: 30 tablet, Rfl: 2      Review of Systems:     Review of Systems   Constitutional:  Positive for activity change. Negative for unexpected weight change.   HENT:  Negative for hearing loss, rhinorrhea and trouble swallowing.    Eyes:  Negative for discharge and visual disturbance.   Respiratory:  Negative for chest tightness and wheezing.    Cardiovascular:  Negative for chest pain and palpitations.   Gastrointestinal:  Negative for blood in stool, constipation, diarrhea and vomiting.   Endocrine: Negative for polydipsia and polyuria.   Genitourinary:  Negative for difficulty urinating, hematuria and urgency.   Musculoskeletal:  Negative for arthralgias, joint swelling and neck pain.   Neurological:  Negative for weakness and headaches.   Psychiatric/Behavioral:  Positive for dysphoric mood. Negative for confusion.            Physical Exam:     Pt is alert and oriented.  Speech is clear and coherent.  Speaking in complete sentences.  No distress.  Mood and affect are normal.      Labs/Imaging/Testing:     Most recent CBC:  Lab Results   Component Value Date    WBC 6.39 05/02/2024    HGB 14.7 05/02/2024     05/02/2024    MCV 94 05/02/2024       Most recent Lipids:  Lab Results   Component Value Date    CHOL 155 05/02/2024    HDL 53 05/02/2024    LDLCALC 89.6 05/02/2024    TRIG 62 05/02/2024       Most recent  Chemistry:  Lab Results   Component Value Date     05/02/2024    K 5.2 (H) 05/02/2024     05/02/2024    CO2 25 05/02/2024    BUN 20 05/02/2024    CREATININE 1.3 05/02/2024    GLU 83 05/02/2024    CALCIUM 9.9 05/02/2024    ALT 19 05/02/2024    AST 22 05/02/2024    ALKPHOS 53 (L) 05/02/2024    PROT 7.1 05/02/2024    ALBUMIN 4.3 05/02/2024       Other tests:  Lab Results   Component Value Date    TSH 1.370 05/02/2024    HGBA1C 5.0 05/02/2024       Assessment and Plan:     1. Current moderate episode of major depressive disorder without prior episode  Start the Wellbutrin  mg daily.  We will plan a 2 to three-week follow-up  May follow up sooner if any problems.  Encouraged patient to call 911 for any active suicidal ideation.      2. Genital warts  Continue the Podofilox gel.  We can offer referral to Urology at any point if he would like.       Yuriy Sinclair MD  9/6/2024    This note was prepared using voice-recognition software.  Although efforts are made to proofread the note, some errors may persist in the final document.    Dr. Sinclair's [place] License number is [id number]  This was a virtual (audio/video visit) in lieu of in-person visit in context of the coronavirus emergency.      Patient/Family members identity was confirmed, and confidentiality/privacy confirmed prior to visit. Verbal informed consent was obtained from the patient's legal guardian or patient when appropriate to conduct this virtual visit.  They authorized me to provide medical care and voiced understanding of the risks, benefits, and alternatives of virtual care.  Guardian understands the limitations inherent of a virtual visit, that they may choose to be seen in person if desired or needed, and that they may halt the virtual visit at any time for any reason.     Originating Site: Patient's home   Distant Site:  [facility]     I certify that this visit was done via secure two-way simultaneous audio and video  transmission with informed consent of the patient and/or guardian. Over 50% of the time was counseling or coordinating care.

## 2024-09-06 ENCOUNTER — TELEPHONE (OUTPATIENT)
Dept: PRIMARY CARE CLINIC | Facility: CLINIC | Age: 44
End: 2024-09-06
Payer: OTHER GOVERNMENT

## 2024-09-06 ENCOUNTER — PATIENT MESSAGE (OUTPATIENT)
Dept: PRIMARY CARE CLINIC | Facility: CLINIC | Age: 44
End: 2024-09-06

## 2024-09-06 ENCOUNTER — OFFICE VISIT (OUTPATIENT)
Dept: PRIMARY CARE CLINIC | Facility: CLINIC | Age: 44
End: 2024-09-06
Payer: OTHER GOVERNMENT

## 2024-09-06 DIAGNOSIS — F32.1 CURRENT MODERATE EPISODE OF MAJOR DEPRESSIVE DISORDER WITHOUT PRIOR EPISODE: Primary | ICD-10-CM

## 2024-09-06 DIAGNOSIS — A63.0 GENITAL WARTS: ICD-10-CM

## 2024-09-06 RX ORDER — BUPROPION HYDROCHLORIDE 150 MG/1
150 TABLET ORAL DAILY
Qty: 30 TABLET | Refills: 5 | Status: SHIPPED | OUTPATIENT
Start: 2024-09-06 | End: 2025-09-06

## 2024-09-17 ENCOUNTER — PATIENT OUTREACH (OUTPATIENT)
Dept: ADMINISTRATIVE | Facility: HOSPITAL | Age: 44
End: 2024-09-17
Payer: OTHER GOVERNMENT

## 2024-09-17 DIAGNOSIS — Z12.11 COLON CANCER SCREENING: Primary | ICD-10-CM

## 2024-09-17 NOTE — PROGRESS NOTES
Chart reviewed  Immunizations reconciled   Message sent to provider to schedule a colonoscopy

## 2024-09-18 ENCOUNTER — PATIENT MESSAGE (OUTPATIENT)
Dept: PRIMARY CARE CLINIC | Facility: CLINIC | Age: 44
End: 2024-09-18
Payer: OTHER GOVERNMENT

## 2024-09-18 DIAGNOSIS — Z29.81 ENCOUNTER FOR HIV PRE-EXPOSURE PROPHYLAXIS: ICD-10-CM

## 2024-09-18 RX ORDER — EMTRICITABINE AND TENOFOVIR ALAFENAMIDE 200; 25 MG/1; MG/1
1 TABLET ORAL
Qty: 30 TABLET | Refills: 3 | Status: SHIPPED | OUTPATIENT
Start: 2024-09-18

## 2024-10-07 NOTE — PROGRESS NOTES
[facility] Progress Note  10/8/2024          Reason for Visit:      had concerns including Depression and Follow-up (Erectile Dsyfunction concerns).       History of Present Illness:        Depression  Wellbutrin  mg daily.    He says his mood has considerably improved.  He attributes this to having a boyfriend now.  He says he has done a lot to get his life together.    He actually says that he is having a benefit with the Wellbutrin and helping with smoking cessation and he would like to continue it for that purpose.    ED  ED has recently been a concern and he attributes some of this maybe to being on finasteride for androgenic alopecia.  He has since stopped that as of yesterday, but would be interested in getting medication to help treat the ED.  We discussed Cialis as a potential option he would like to take a daily dose of that.  Related to this, he would be interested in trying to get his testosterone checked.  We encouraged also getting thyroid functions checked since he has had a little bit fatigue and the depressed mood recently.        Problem List:     Patient Active Problem List   Diagnosis    On pre-exposure prophylaxis for HIV    Androgenic alopecia    History of Fever blisters    STD (male)         Medications:       Current Outpatient Medications:     buPROPion (WELLBUTRIN XL) 150 MG TB24 tablet, Take 1 tablet (150 mg total) by mouth once daily., Disp: 30 tablet, Rfl: 5    DESCOVY 200-25 mg Tab, TAKE 1 TABLET BY MOUTH DAILY, Disp: 30 tablet, Rfl: 3    triamcinolone acetonide 0.1% (KENALOG) 0.1 % cream, Apply topically 2 (two) times daily. (Patient taking differently: Apply topically 2 (two) times daily. As needd), Disp: 15 g, Rfl: 3    valACYclovir (VALTREX) 1000 MG tablet, Take 2 tablets (2,000 mg total) by mouth 2 (two) times daily as needed (as needed for fever blister)., Disp: 30 tablet, Rfl: 2    azithromycin (ZITHROMAX) 500 MG tablet, Take 2 tablets (1,000 mg total)  by mouth once daily. (Patient not taking: Reported on 10/8/2024), Disp: 2 tablet, Rfl: 0    finasteride (PROPECIA) 1 mg tablet, Take 1 tablet (1 mg total) by mouth once daily. (Patient not taking: Reported on 10/8/2024), Disp: 30 tablet, Rfl: 3    tadalafiL (CIALIS) 5 MG tablet, Take 1 tablet (5 mg total) by mouth daily as needed for Erectile Dysfunction., Disp: 30 tablet, Rfl: 5        Review of Systems:     Review of Systems   Constitutional:  Negative for activity change and unexpected weight change.   HENT:  Negative for hearing loss, rhinorrhea and trouble swallowing.    Eyes:  Negative for discharge and visual disturbance.   Respiratory:  Negative for chest tightness and wheezing.    Cardiovascular:  Negative for chest pain and palpitations.   Gastrointestinal:  Negative for blood in stool, constipation, diarrhea and vomiting.   Endocrine: Negative for polydipsia and polyuria.   Genitourinary:  Negative for difficulty urinating, hematuria and urgency.   Musculoskeletal:  Negative for arthralgias, joint swelling and neck pain.   Neurological:  Negative for weakness and headaches.   Psychiatric/Behavioral:  Negative for confusion and dysphoric mood.            Physical Exam:     Temp:             Blood Pressure:  (!) 128/90        Pulse:   68     Respirations:     Weight:   79.8 kg (175 lb 14.8 oz)  Height:   6' (1.829 m)  BMI:     Body mass index is 23.86 kg/m².    Physical Exam  Constitutional:       General: He is not in acute distress.  HENT:      Right Ear: Tympanic membrane normal.      Left Ear: Tympanic membrane normal.      Nose: No congestion or rhinorrhea.      Mouth/Throat:      Pharynx: No oropharyngeal exudate or posterior oropharyngeal erythema.   Cardiovascular:      Rate and Rhythm: Normal rate and regular rhythm.   Pulmonary:      Effort: Pulmonary effort is normal. No respiratory distress.      Breath sounds: No wheezing.   Abdominal:      Palpations: Abdomen is soft.      Tenderness: There is  no abdominal tenderness.   Skin:     General: Skin is warm.   Neurological:      General: No focal deficit present.      Mental Status: He is alert and oriented to person, place, and time.           Labs/Imaging/Testing:     Most recent CBC:  Lab Results   Component Value Date    WBC 6.39 05/02/2024    HGB 14.7 05/02/2024     05/02/2024    MCV 94 05/02/2024       Most recent Lipids:  Lab Results   Component Value Date    CHOL 155 05/02/2024    HDL 53 05/02/2024    LDLCALC 89.6 05/02/2024    TRIG 62 05/02/2024       Most recent Chemistry:  Lab Results   Component Value Date     05/02/2024    K 5.2 (H) 05/02/2024     05/02/2024    CO2 25 05/02/2024    BUN 20 05/02/2024    CREATININE 1.3 05/02/2024    GLU 83 05/02/2024    CALCIUM 9.9 05/02/2024    ALT 19 05/02/2024    AST 22 05/02/2024    ALKPHOS 53 (L) 05/02/2024    PROT 7.1 05/02/2024    ALBUMIN 4.3 05/02/2024       Other tests:  Lab Results   Component Value Date    TSH 1.370 05/02/2024    HGBA1C 5.0 05/02/2024       Assessment and Plan:     1. Erectile dysfunction, unspecified erectile dysfunction type  - tadalafiL (CIALIS) 5 MG tablet; Take 1 tablet (5 mg total) by mouth daily as needed for Erectile Dysfunction.  Dispense: 30 tablet; Refill: 5  - Testosterone; Future  - TSH; Future  - T4, Free; Future      2. Episode of recurrent major depressive disorder, unspecified depression episode severity  Doing better, continue the Wellbutrin which has been helpful for the smoking cessation as well.    RTC 6 mos or dimple Sinclair MD  10/8/2024    This note was prepared using voice-recognition software.  Although efforts are made to proofread the note, some errors may persist in the final document.

## 2024-10-08 ENCOUNTER — OFFICE VISIT (OUTPATIENT)
Dept: PRIMARY CARE CLINIC | Facility: CLINIC | Age: 44
End: 2024-10-08
Payer: OTHER GOVERNMENT

## 2024-10-08 VITALS
DIASTOLIC BLOOD PRESSURE: 88 MMHG | BODY MASS INDEX: 23.83 KG/M2 | HEIGHT: 72 IN | SYSTOLIC BLOOD PRESSURE: 128 MMHG | WEIGHT: 175.94 LBS | OXYGEN SATURATION: 97 % | HEART RATE: 68 BPM

## 2024-10-08 DIAGNOSIS — N52.9 ERECTILE DYSFUNCTION, UNSPECIFIED ERECTILE DYSFUNCTION TYPE: Primary | ICD-10-CM

## 2024-10-08 DIAGNOSIS — F33.9 EPISODE OF RECURRENT MAJOR DEPRESSIVE DISORDER, UNSPECIFIED DEPRESSION EPISODE SEVERITY: ICD-10-CM

## 2024-10-08 PROCEDURE — 99213 OFFICE O/P EST LOW 20 MIN: CPT | Mod: PBBFAC,PN | Performed by: INTERNAL MEDICINE

## 2024-10-08 PROCEDURE — 99999 PR PBB SHADOW E&M-EST. PATIENT-LVL III: CPT | Mod: PBBFAC,,, | Performed by: INTERNAL MEDICINE

## 2024-10-08 RX ORDER — TADALAFIL 5 MG/1
5 TABLET ORAL DAILY PRN
Qty: 30 TABLET | Refills: 5 | Status: SHIPPED | OUTPATIENT
Start: 2024-10-08 | End: 2025-10-08

## 2024-10-08 RX ORDER — TADALAFIL 5 MG/1
5 TABLET ORAL DAILY PRN
Qty: 30 TABLET | Refills: 5 | Status: SHIPPED | OUTPATIENT
Start: 2024-10-08 | End: 2024-10-08

## 2024-10-09 ENCOUNTER — LAB VISIT (OUTPATIENT)
Dept: LAB | Facility: HOSPITAL | Age: 44
End: 2024-10-09
Attending: INTERNAL MEDICINE
Payer: OTHER GOVERNMENT

## 2024-10-09 DIAGNOSIS — N52.9 ERECTILE DYSFUNCTION, UNSPECIFIED ERECTILE DYSFUNCTION TYPE: ICD-10-CM

## 2024-10-09 DIAGNOSIS — F33.9 EPISODE OF RECURRENT MAJOR DEPRESSIVE DISORDER, UNSPECIFIED DEPRESSION EPISODE SEVERITY: ICD-10-CM

## 2024-10-09 LAB
T4 FREE SERPL-MCNC: 0.82 NG/DL (ref 0.71–1.51)
TESTOST SERPL-MCNC: 803 NG/DL (ref 304–1227)
TSH SERPL DL<=0.005 MIU/L-ACNC: 1.08 UIU/ML (ref 0.4–4)

## 2024-10-09 PROCEDURE — 84439 ASSAY OF FREE THYROXINE: CPT | Performed by: INTERNAL MEDICINE

## 2024-10-09 PROCEDURE — 84443 ASSAY THYROID STIM HORMONE: CPT | Performed by: INTERNAL MEDICINE

## 2024-10-09 PROCEDURE — 36415 COLL VENOUS BLD VENIPUNCTURE: CPT | Mod: PN | Performed by: INTERNAL MEDICINE

## 2024-10-09 PROCEDURE — 84403 ASSAY OF TOTAL TESTOSTERONE: CPT | Performed by: INTERNAL MEDICINE

## 2024-12-31 ENCOUNTER — CLINICAL SUPPORT (OUTPATIENT)
Dept: ENDOSCOPY | Facility: HOSPITAL | Age: 44
End: 2024-12-31
Attending: INTERNAL MEDICINE
Payer: OTHER GOVERNMENT

## 2024-12-31 ENCOUNTER — TELEPHONE (OUTPATIENT)
Dept: ENDOSCOPY | Facility: HOSPITAL | Age: 44
End: 2024-12-31

## 2024-12-31 DIAGNOSIS — Z12.11 COLON CANCER SCREENING: ICD-10-CM

## 2024-12-31 RX ORDER — SODIUM, POTASSIUM,MAG SULFATES 17.5-3.13G
1 SOLUTION, RECONSTITUTED, ORAL ORAL DAILY
Qty: 1 KIT | Refills: 0 | Status: SHIPPED | OUTPATIENT
Start: 2024-12-31 | End: 2025-01-02

## 2024-12-31 NOTE — TELEPHONE ENCOUNTER
Referral for procedure from PAT appointment      Spoke to patient to schedule procedure(s) Colonoscopy       Physician to perform procedure(s) Dr. JOJO Gibbons  Date of Procedure (s) 2/24/25  Arrival Time 8:00 AM  Time of Procedure(s) 9:00 AM   Location of Procedure(s) [place] [address]nd Floor  Type of Rx Prep sent to patient: Suprep  Instructions provided to [facility] via [facility]    Patient was informed on the following information and verbalized understanding. Screening questionnaire reviewed with patient and complete. If procedure requires anesthesia, a responsible adult needs to be present to accompany the patient home, patient cannot drive after receiving anesthesia. Appointment details are tentative, especially check-in time. Patient will receive a prep-op call 7 days prior to confirm check-in time for procedure. If applicable the patient should contact their pharmacy to verify Rx for procedure prep is ready for pick-up. Patient was advised to call the scheduling department at [phone] if pharmacy states no Rx is available. Patient was advised to call the endoscopy scheduling department if any questions or concerns arise.      [facility] Scheduling Department

## 2025-01-16 ENCOUNTER — ANESTHESIA EVENT (OUTPATIENT)
Dept: ENDOSCOPY | Facility: HOSPITAL | Age: 45
End: 2025-01-16
Payer: OTHER GOVERNMENT

## 2025-01-16 NOTE — ANESTHESIA PREPROCEDURE EVALUATION
01/16/2025  Jono Cuello is a 44 y.o., male.  [facility]  Anesthesia Pre-Operative Evaluation       Patient Name: Jono Cuello  YOB: 1980  MRN: [id number]  CSN: [id number]      Code Status: No Order   Date of Procedure: 2/24/2025  Anesthesia: Choice Procedure: Procedure(s) (LRB):  COLONOSCOPY, SCREENING, LOW RISK PATIENT (N/A)  Pre-Operative Diagnosis: Colon cancer screening [Z12.11]  Proceduralist: Surgeons and Role:     * Giovana Gibbons MD - Primary        SUBJECTIVE:   Jono Cuello is a 44 y.o. male who  has no past medical history on file..     he has a current medication list which includes the following long-term medication(s): bupropion, tadalafil, triamcinolone acetonide 0.1%, and valacyclovir.     ALLERGIES:   Review of patient's allergies indicates:  No Known Allergies  LDA:          Lines/Drains/Airways       None                  Anesthesia Evaluation      Airway   Mallampati: II  TM distance: Normal  Dental      Pulmonary    Cardiovascular     Rate: Normal    Neuro/Psych      GI/Hepatic/Renal      Endo/Other    Abdominal                   MEDICATIONS:     Antibiotics (From admission, onward)      None          VTE Risk Mitigation (From admission, onward)      None              No current facility-administered medications for this encounter.     Current Outpatient Medications   Medication Sig Dispense Refill    azithromycin (ZITHROMAX) 500 MG tablet Take 2 tablets (1,000 mg total) by mouth once daily. (Patient not taking: Reported on 10/8/2024) 2 tablet 0    buPROPion (WELLBUTRIN XL) 150 MG TB24 tablet Take 1 tablet (150 mg total) by mouth once daily. 30 tablet 5    DESCOVY 200-25 mg Tab TAKE 1 TABLET BY MOUTH DAILY 30 tablet 3    finasteride (PROPECIA) 1 mg tablet Take 1 tablet (1 mg total) by mouth once daily. (Patient not taking: Reported on 10/8/2024) 30 tablet  "3    tadalafiL (CIALIS) 5 MG tablet Take 1 tablet (5 mg total) by mouth daily as needed for Erectile Dysfunction. 30 tablet 5    triamcinolone acetonide 0.1% (KENALOG) 0.1 % cream Apply topically 2 (two) times daily. (Patient taking differently: Apply topically 2 (two) times daily. As needd) 15 g 3    valACYclovir (VALTREX) 1000 MG tablet Take 2 tablets (2,000 mg total) by mouth 2 (two) times daily as needed (as needed for fever blister). 30 tablet 2          History:   There are no hospital problems to display for this patient.    Surgical History:    has a past surgical history that includes none.   Social History:    has no history on file for sexual activity.  reports that he has never smoked. He has never been exposed to tobacco smoke. He has never used smokeless tobacco. He reports current alcohol use of about 3.0 standard drinks of alcohol per week. He reports that he does not use drugs.     OBJECTIVE:     Vital Signs (Most Recent):    Vital Signs Range (Last 24H):          There is no height or weight on file to calculate BMI.   Wt Readings from Last 4 Encounters:   10/08/24 79.8 kg (175 lb 14.8 oz)   06/10/24 78.2 kg (172 lb 6.4 oz)   05/07/24 78.2 kg (172 lb 6.4 oz)   05/02/24 77.2 kg (170 lb 3.1 oz)       Significant Labs:  Lab Results   Component Value Date    WBC 6.39 05/02/2024    HGB 14.7 05/02/2024    HCT 42.5 05/02/2024     05/02/2024     05/02/2024    K 5.2 (H) 05/02/2024     05/02/2024    CREATININE 1.3 05/02/2024    BUN 20 05/02/2024    CO2 25 05/02/2024    GLU 83 05/02/2024    CALCIUM 9.9 05/02/2024    ALKPHOS 53 (L) 05/02/2024    ALT 19 05/02/2024    AST 22 05/02/2024    ALBUMIN 4.3 05/02/2024    HGBA1C 5.0 05/02/2024     No LMP for male patient.  No results found for this or any previous visit (from the past 72 hours).    EKG:   No results found for this or any previous visit.    TTE:  No results found for this or any previous visit.  No results found for: "EF"   No " "results found for this or any previous visit.  JAYA:  No results found for this or any previous visit.  Stress Test:  No results found for this or any previous visit.     LHC:  No results found for this or any previous visit.     PFT:  No results found for: "FEV1", "FVC", "VXE4TPA", "TLC", "DLCO"     ASSESSMENT/PLAN:        Pre-op Assessment    I have reviewed the Patient Summary Reports.     I have reviewed the Nursing Notes. I have reviewed the NPO Status.   I have reviewed the Medications.     Review of Systems  Anesthesia Hx:  No problems with previous Anesthesia             Denies Family Hx of Anesthesia complications.    Denies Personal Hx of Anesthesia complications.                    Social:   STD male  Pre-exposure Prophylaxis HIV      Hematology/Oncology:  Hematology Normal   Oncology Normal                                   EENT/Dental:  EENT/Dental Normal           Cardiovascular:  Cardiovascular Normal                                              Pulmonary:  Pulmonary Normal                       Renal/:  Renal/ Normal                 Hepatic/GI:  Hepatic/GI Normal                    Musculoskeletal:  Musculoskeletal Normal                Neurological:  Neurology Normal                                      Endocrine:  Endocrine Normal            Dermatological:  Skin Normal    Psych:  Psychiatric Normal                  Physical Exam  General: Well nourished    Airway:  Mallampati: II   Mouth Opening: Normal  TM Distance: Normal    Chest/Lungs:  Normal Respiratory Rate    Heart:  Rate: Normal    Anesthesia Plan  Type of Anesthesia, risks & benefits discussed:    Anesthesia Type: Gen Natural Airway  Intra-op Monitoring Plan: Standard ASA Monitors  Post Op Pain Control Plan: multimodal analgesia  Induction:  IV  Informed Consent: Informed consent signed with the Patient and all parties understand the risks and agree with anesthesia plan.  All questions answered.   ASA Score: 2  Day of Surgery Review " of History & Physical: H&P Update referred to the surgeon/provider.    Ready For Surgery From Anesthesia Perspective.     .

## 2025-02-18 ENCOUNTER — OFFICE VISIT (OUTPATIENT)
Dept: PRIMARY CARE CLINIC | Facility: CLINIC | Age: 45
End: 2025-02-18
Payer: OTHER GOVERNMENT

## 2025-02-18 VITALS
RESPIRATION RATE: 14 BRPM | DIASTOLIC BLOOD PRESSURE: 76 MMHG | HEIGHT: 72 IN | OXYGEN SATURATION: 99 % | WEIGHT: 184.31 LBS | BODY MASS INDEX: 24.96 KG/M2 | SYSTOLIC BLOOD PRESSURE: 118 MMHG | HEART RATE: 60 BPM

## 2025-02-18 DIAGNOSIS — R05.3 CHRONIC COUGH: Primary | ICD-10-CM

## 2025-02-18 PROCEDURE — 99214 OFFICE O/P EST MOD 30 MIN: CPT | Mod: PBBFAC,PN

## 2025-02-18 RX ORDER — PREDNISONE 20 MG/1
20 TABLET ORAL DAILY
Qty: 5 TABLET | Refills: 0 | Status: SHIPPED | OUTPATIENT
Start: 2025-02-18 | End: 2025-02-23

## 2025-02-18 RX ORDER — ACYCLOVIR 50 MG/G
1 OINTMENT TOPICAL EVERY 4 HOURS
COMMUNITY
Start: 2025-02-13

## 2025-02-18 RX ORDER — BENZONATATE 100 MG/1
100 CAPSULE ORAL 3 TIMES DAILY PRN
Qty: 30 CAPSULE | Refills: 0 | Status: SHIPPED | OUTPATIENT
Start: 2025-02-18 | End: 2025-02-28

## 2025-02-18 RX ORDER — CETIRIZINE HYDROCHLORIDE 10 MG/1
10 TABLET ORAL DAILY
Qty: 30 TABLET | Refills: 0 | Status: SHIPPED | OUTPATIENT
Start: 2025-02-18 | End: 2025-03-20

## 2025-02-18 NOTE — PROGRESS NOTES
[facility] Progress Note  2/18/2025          Reason for Visit:      had concerns including Cough (Pt states he has been having a chronic cough for at least a month).       History of Present Illness:         Health Maintenance Due   Topic Date Due    TETANUS VACCINE  Never done    Colorectal Cancer Screening  Never done    Influenza Vaccine (1) 09/01/2024    COVID-19 Vaccine (3 - 2024-25 season) 09/01/2024     History of Present Illness    CHIEF COMPLAINT:  Patient presents today for chronic cough of one month duration.    HISTORY OF PRESENT ILLNESS:  He presents with chronic cough for one month, which has worsened in the past two days. He experienced particularly severe episodes last week occurring nightly around 7-8 PM, lasting approximately one hour. He reports productive cough with difficulty expectorating sputum completely. He experiences shortness of breath with exertion and post nasal drip at night. He denies fever, night sweats, recent cold symptoms, chest tightness, wheezing, or muscle pain. He denies any prior history of similar prolonged cough episodes.Did not take any cough drops or OTC Cough and cold meds.     MEDICAL HISTORY:  He denies history of asthma or COPD.     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    IMMUNIZATIONS:  He is up to date with COVID and flu vaccines.          Assessment & Plan    IMPRESSION:  - Considered seasonal allergies as potential cause of chronic cough  - Assessed for possible bronchitis given 3-week duration of symptoms  - Ruled out pneumonia based on clear lung sounds and no fever, chills  - Evaluated shortness of breath, determined it is only with exertion    CHRONIC COUGH:  - Evaluated the patient's chronic cough, which has persisted for 1 month and worsened in the past 2 days, particularly at night.  - Noted that the cough is somewhat productive, with no associated fever, night sweats, or recent cold.  - Confirmed the  patient has no history of smoking.  - Assessed clear lungs upon exam  - Explained that cough can linger for extended periods.  - Discussed potential causes of cough, including reflux, environmental factors, seasonal allergies, post-nasal drip, and bronchitis.  - Prescribed Tessalon Pearls 100mg up to 3 times daily for cough relief.  - Prescribed Prednisone 20mg daily for 5 days to address SOB  - Prescribed Zyrtec daily for potential allergies and post-nasal drip.  - Recommend OTC Mucinex if other medications are not sufficient.  - Instructed the patient to contact the office if prescribed medications do not provide relief.        VACCINATIONS:  - Confirmed that the patient has been vaccinated for COVID-19 and influenza.            Problem List:     Patient Active Problem List  Diagnosis    On pre-exposure prophylaxis for HIV    Androgenic alopecia    History of Fever blisters    STD (male)         Medications:       Current Outpatient Medications:     acyclovir 5% (ZOVIRAX) 5 % ointment, Apply 1 application  topically every 4 (four) hours., Disp: , Rfl:     DESCOVY 200-25 mg Tab, TAKE 1 TABLET BY MOUTH DAILY, Disp: 30 tablet, Rfl: 3    tadalafiL (CIALIS) 5 MG tablet, Take 1 tablet (5 mg total) by mouth daily as needed for Erectile Dysfunction., Disp: 30 tablet, Rfl: 5    triamcinolone acetonide 0.1% (KENALOG) 0.1 % cream, Apply topically 2 (two) times daily., Disp: 15 g, Rfl: 3    benzonatate (TESSALON) 100 MG capsule, Take 1 capsule (100 mg total) by mouth 3 (three) times daily as needed for Cough., Disp: 30 capsule, Rfl: 0    cetirizine (ZYRTEC) 10 MG tablet, Take 1 tablet (10 mg total) by mouth once daily., Disp: 30 tablet, Rfl: 0    predniSONE (DELTASONE) 20 MG tablet, Take 1 tablet (20 mg total) by mouth once daily. for 5 days, Disp: 5 tablet, Rfl: 0    valACYclovir (VALTREX) 1000 MG tablet, Take 2 tablets (2,000 mg total) by mouth 2 (two) times daily as needed (as needed for fever blister)., Disp: 30 tablet,  Rfl: 2        Review of Systems:     Review of Systems   Constitutional:  Negative for chills, fever and night sweats.   HENT:  Positive for postnasal drip. Negative for nasal congestion, rhinorrhea and sinus pressure/congestion.    Respiratory:  Positive for cough and shortness of breath. Negative for wheezing.    Cardiovascular:  Negative for chest pain and palpitations.   Allergic/Immunologic: Negative for environmental allergies, food allergies and frequent infections.           Physical Exam:     Temp:             Blood Pressure:  118/76        Pulse:   60     Respirations:  14  Weight:   83.6 kg (184 lb 4.9 oz)  Height:   6' (1.829 m)  BMI:     Body mass index is 25 kg/m².    Physical Exam  Constitutional:       General: He is not in acute distress.     Appearance: Normal appearance. He is normal weight. He is not ill-appearing, toxic-appearing or diaphoretic.   HENT:      Right Ear: Tympanic membrane, ear canal and external ear normal. There is no impacted cerumen.      Left Ear: Tympanic membrane, ear canal and external ear normal. There is no impacted cerumen.      Mouth/Throat:      Mouth: Mucous membranes are moist.      Pharynx: Oropharynx is clear. No oropharyngeal exudate or posterior oropharyngeal erythema.   Eyes:      General: No scleral icterus.     Extraocular Movements: Extraocular movements intact.      Pupils: Pupils are equal, round, and reactive to light.   Cardiovascular:      Rate and Rhythm: Normal rate and regular rhythm.      Pulses: Normal pulses.      Heart sounds: Normal heart sounds. No murmur heard.  Pulmonary:      Effort: Pulmonary effort is normal. No respiratory distress.      Breath sounds: Normal breath sounds. No wheezing.   Chest:      Chest wall: No tenderness.   Musculoskeletal:         General: Normal range of motion.   Neurological:      General: No focal deficit present.      Mental Status: He is alert and oriented to person, place, and time. Mental status is at  baseline.   Psychiatric:         Behavior: Behavior normal.          Physical Exam    Lungs: All normal.        Labs/Imaging/Testing:     Lab Results   Component Value Date    WBC 6.39 05/02/2024    HGB 14.7 05/02/2024    HCT 42.5 05/02/2024    MCV 94 05/02/2024     05/02/2024        CMP  Sodium   Date Value Ref Range Status   05/02/2024 137 136 - 145 mmol/L Final     Potassium   Date Value Ref Range Status   05/02/2024 5.2 (H) 3.5 - 5.1 mmol/L Final     Chloride   Date Value Ref Range Status   05/02/2024 105 95 - 110 mmol/L Final     CO2   Date Value Ref Range Status   05/02/2024 25 23 - 29 mmol/L Final     Glucose   Date Value Ref Range Status   05/02/2024 83 70 - 110 mg/dL Final     BUN   Date Value Ref Range Status   05/02/2024 20 6 - 20 mg/dL Final     Creatinine   Date Value Ref Range Status   05/02/2024 1.3 0.5 - 1.4 mg/dL Final     Calcium   Date Value Ref Range Status   05/02/2024 9.9 8.7 - 10.5 mg/dL Final     Total Protein   Date Value Ref Range Status   05/02/2024 7.1 6.0 - 8.4 g/dL Final     Albumin   Date Value Ref Range Status   05/02/2024 4.3 3.5 - 5.2 g/dL Final     Total Bilirubin   Date Value Ref Range Status   05/02/2024 0.6 0.1 - 1.0 mg/dL Final     Comment:     For infants and newborns, interpretation of results should be based  on gestational age, weight and in agreement with clinical  observations.    Premature Infant recommended reference ranges:  Up to 24 hours.............<8.0 mg/dL  Up to 48 hours............<12.0 mg/dL  3-5 days..................<15.0 mg/dL  6-29 days.................<15.0 mg/dL       Alkaline Phosphatase   Date Value Ref Range Status   05/02/2024 53 (L) 55 - 135 U/L Final     AST   Date Value Ref Range Status   05/02/2024 22 10 - 40 U/L Final     ALT   Date Value Ref Range Status   05/02/2024 19 10 - 44 U/L Final     Anion Gap   Date Value Ref Range Status   05/02/2024 7 (L) 8 - 16 mmol/L Final     eGFR   Date Value Ref Range Status   05/02/2024 >60.0 >60  mL/min/1.73 m^2 Final       Lab Results   Component Value Date    TSH 1.078 10/09/2024       Lab Results   Component Value Date    HGBA1C 5.0 05/02/2024       Lab Results   Component Value Date    CHOL 155 05/02/2024    CHOL 171 03/07/2023     Lab Results   Component Value Date    HDL 53 05/02/2024    HDL 51 03/07/2023     Lab Results   Component Value Date    LDLCALC 89.6 05/02/2024    LDLCALC 109.8 03/07/2023     Lab Results   Component Value Date    TRIG 62 05/02/2024    TRIG 51 03/07/2023       Lab Results   Component Value Date    CHOLHDL 34.2 05/02/2024    CHOLHDL 29.8 03/07/2023               Assessment and Plan:         1. Chronic cough  -     cetirizine (ZYRTEC) 10 MG tablet; Take 1 tablet (10 mg total) by mouth once daily.  Dispense: 30 tablet; Refill: 0  -     benzonatate (TESSALON) 100 MG capsule; Take 1 capsule (100 mg total) by mouth 3 (three) times daily as needed for Cough.  Dispense: 30 capsule; Refill: 0  -     predniSONE (DELTASONE) 20 MG tablet; Take 1 tablet (20 mg total) by mouth once daily. for 5 days  Dispense: 5 tablet; Refill: 0           Follow up if symptoms worsen or fail to improve.     Cristina Randle MD  [facility]   2/18/2025    This note was generated with the assistance of ambient listening technology. Verbal consent was obtained by the patient and accompanying visitor(s) for the recording of patient appointment to facilitate this note. I attest to having reviewed and edited the generated note for accuracy, though some syntax or spelling errors may persist. Please contact the author of this note for any clarification.       Thank you for the opportunity to care for you. We strive to always provide excellent care. Please complete a survey if you receive one and let us know how we are doing    This note was prepared using voice-recognition software.  Although efforts are made to proofread the note, some errors may persist in the final document.

## 2025-02-20 ENCOUNTER — TELEPHONE (OUTPATIENT)
Dept: ENDOSCOPY | Facility: HOSPITAL | Age: 45
End: 2025-02-20
Payer: OTHER GOVERNMENT

## 2025-02-20 ENCOUNTER — TELEPHONE (OUTPATIENT)
Dept: GASTROENTEROLOGY | Facility: CLINIC | Age: 45
End: 2025-02-20
Payer: OTHER GOVERNMENT

## 2025-02-20 NOTE — TELEPHONE ENCOUNTER
Patient rescheduled.  Original Referral for procedure from PAT appointment    Spoke to Jono Cuello to reschedule Colonoscopy       Physician to perform procedure(s) Dr. JOJO Gibbons  Date of Procedure (s) 4/7/25  Arrival Time 9:00 AM  Time of Procedure(s) 10:00 AM   Location of Procedure(s) [place] [address]nd Floor  Type of Rx Prep sent to patient's pharmacy: Suprep  Instructions provided to patient via [facility]  Patient denies use of blood thinners, GLP-1 medications, and weight loss medications.  The following information was discussed with patient, and patient verbalized understanding:  Screening questionnaire reviewed with patient and complete. If procedure requires anesthesia, a responsible adult needs to be present to accompany the patient home. Appointment details are tentative, especially check-in time. Patient will receive a pre-op call 7 days prior to appointment to confirm check-in time for procedure. If applicable the patient should contact their pharmacy to verify Rx for procedure prep is ready for pick-up. Patient was instructed to call the scheduling department at [phone] if pharmacy states no Rx is available. Patient was also advised to call the endoscopy scheduling department if any questions or concerns arise.       Endoscopy Scheduling Department

## 2025-02-20 NOTE — TELEPHONE ENCOUNTER
----- Message from Babita sent at 2/20/2025  8:46 AM CST -----  Regarding: Rescchedule  Contact: [phone]  Type:  Needs Medical AdviceWho Called: Fernandoling to reschedule procedure scheduled for 2/24 Would the patient rather a call back or a response via [facility]? 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

## 2025-02-24 ENCOUNTER — ANESTHESIA (OUTPATIENT)
Dept: ENDOSCOPY | Facility: HOSPITAL | Age: 45
End: 2025-02-24
Payer: OTHER GOVERNMENT

## 2025-03-21 ENCOUNTER — OFFICE VISIT (OUTPATIENT)
Dept: PRIMARY CARE CLINIC | Facility: CLINIC | Age: 45
End: 2025-03-21
Payer: OTHER GOVERNMENT

## 2025-03-21 VITALS
SYSTOLIC BLOOD PRESSURE: 124 MMHG | WEIGHT: 184.31 LBS | HEIGHT: 72 IN | HEART RATE: 74 BPM | DIASTOLIC BLOOD PRESSURE: 84 MMHG | BODY MASS INDEX: 24.96 KG/M2 | OXYGEN SATURATION: 99 %

## 2025-03-21 DIAGNOSIS — F41.9 ANXIETY: ICD-10-CM

## 2025-03-21 DIAGNOSIS — N52.9 ERECTILE DYSFUNCTION, UNSPECIFIED ERECTILE DYSFUNCTION TYPE: ICD-10-CM

## 2025-03-21 DIAGNOSIS — A53.0 POSITIVE RPR TEST: ICD-10-CM

## 2025-03-21 DIAGNOSIS — Z29.81 ENCOUNTER FOR HIV PRE-EXPOSURE PROPHYLAXIS: Primary | ICD-10-CM

## 2025-03-21 PROCEDURE — 99999 PR PBB SHADOW E&M-EST. PATIENT-LVL III: CPT | Mod: PBBFAC,,, | Performed by: INTERNAL MEDICINE

## 2025-03-21 PROCEDURE — 99213 OFFICE O/P EST LOW 20 MIN: CPT | Mod: PBBFAC,PN | Performed by: INTERNAL MEDICINE

## 2025-03-21 RX ORDER — EMTRICITABINE AND TENOFOVIR ALAFENAMIDE 200; 25 MG/1; MG/1
1 TABLET ORAL DAILY
Qty: 30 TABLET | Refills: 3 | Status: SHIPPED | OUTPATIENT
Start: 2025-03-21

## 2025-03-21 RX ORDER — ALPRAZOLAM 0.25 MG/1
0.25 TABLET ORAL 3 TIMES DAILY
Qty: 30 TABLET | Refills: 1 | Status: SHIPPED | OUTPATIENT
Start: 2025-03-21 | End: 2025-04-20

## 2025-03-21 RX ORDER — TADALAFIL 20 MG/1
20 TABLET ORAL DAILY
Qty: 30 TABLET | Refills: 5 | Status: SHIPPED | OUTPATIENT
Start: 2025-03-21 | End: 2026-03-21

## 2025-03-21 NOTE — PROGRESS NOTES
[facility] Progress Note  3/21/2025          Reason for Visit:      had concerns including Follow-up (testing & updated prep script) and Nasal Congestion (Mucus, cough, nasal drip; started 2 months ago ).       History of Present Illness:     Patient presents today for follow up of persistent cough    RESPIRATORY:  He reports chronic cough since January, with symptoms worse in morning and at night, including episodes of nocturnal choking. He denies correlation with eating, nasal congestion, or heartburn symptoms. He currently takes Zyrtec and Flonase for symptom management.    Depression  He says his mood has considerably improved.  He attributes this to having a relationship now.  He says he has done a lot to get his life together.    He has been successful in smoking cessation as well.     ANXIETY:  He reports significant anxiety related to an ongoing dog custody dispute with ex-partner, affecting his ability to work some days. He has previous history of using Xanax approximately 10 years ago with good response. He is currently participating in teletherapy sessions. He previously took Wellbutrin and denies current depression.    SEXUAL HEALTH:  He has history of erectile dysfunction thought to be related to finasteride use. He currently takes Cialis 5mg daily with noted improvement in symptoms, but would like to change to the 20 mg prn dose.  On prep - Descovy-  No worrisome symptoms or new exposures.  Wants to get labs today for monitoring.      HM  He is scheduled for colonoscopy on the 7th.         Problem List:     Problem List[1]      Medications:     Current Medications[2]        Review of Systems:     Review of Systems   Constitutional:  Negative for activity change and unexpected weight change.   HENT:  Negative for hearing loss, rhinorrhea and trouble swallowing.    Eyes:  Negative for discharge and visual disturbance.   Respiratory:  Negative for chest tightness and wheezing.     Cardiovascular:  Negative for chest pain and palpitations.   Gastrointestinal:  Negative for blood in stool, constipation, diarrhea and vomiting.   Endocrine: Negative for polydipsia and polyuria.   Genitourinary:  Negative for difficulty urinating, hematuria and urgency.   Musculoskeletal:  Negative for arthralgias, joint swelling and neck pain.   Neurological:  Negative for weakness and headaches.   Psychiatric/Behavioral:  Negative for confusion and dysphoric mood.        Physical Exam:     Temp:             Blood Pressure:  124/84        Pulse:   74     Respirations:     Weight:   83.6 kg (184 lb 4.9 oz)  Height:   6' (1.829 m)  BMI:     Body mass index is 25 kg/m².    Physical Exam  Constitutional:       General: He is not in acute distress.  HENT:      Right Ear: Tympanic membrane normal.      Left Ear: Tympanic membrane normal.      Nose: No congestion or rhinorrhea.      Mouth/Throat:      Pharynx: No oropharyngeal exudate or posterior oropharyngeal erythema.   Cardiovascular:      Rate and Rhythm: Normal rate and regular rhythm.   Pulmonary:      Effort: Pulmonary effort is normal. No respiratory distress.      Breath sounds: No wheezing.   Abdominal:      Palpations: Abdomen is soft.      Tenderness: There is no abdominal tenderness.   Skin:     General: Skin is warm.   Neurological:      General: No focal deficit present.      Mental Status: He is alert and oriented to person, place, and time.         Labs/Imaging/Testing:     Most recent CBC:  Lab Results   Component Value Date    WBC 6.39 05/02/2024    HGB 14.7 05/02/2024     05/02/2024    MCV 94 05/02/2024       Most recent Lipids:  Lab Results   Component Value Date    CHOL 155 05/02/2024    HDL 53 05/02/2024    LDLCALC 89.6 05/02/2024    TRIG 62 05/02/2024       Most recent Chemistry:  Lab Results   Component Value Date     05/02/2024    K 5.2 (H) 05/02/2024     05/02/2024    CO2 25 05/02/2024    BUN 20 05/02/2024    CREATININE  1.3 05/02/2024    GLU 83 05/02/2024    CALCIUM 9.9 05/02/2024    ALT 19 05/02/2024    AST 22 05/02/2024    ALKPHOS 53 (L) 05/02/2024    PROT 7.1 05/02/2024    ALBUMIN 4.3 05/02/2024       Other tests:  Lab Results   Component Value Date    TSH 1.078 10/09/2024    FREET4 0.82 10/09/2024    HGBA1C 5.0 05/02/2024       Assessment and Plan:     1. Encounter for HIV pre-exposure prophylaxis  - Confirmed no recent HIV exposures or symptoms.  - Continued prescribing Descovy, likely for HIV pre-exposure prophylaxis (PrEP).  - Scheduled follow-up for routine HIV testing and medication monitoring.    - Comprehensive Metabolic Panel; Future  - C. trachomatis/N. gonorrhoeae by AMP DNA; Future  - HIV 1/2 Ag/Ab (4th Gen); Future  - Trichomonas vaginalis, RNA, Qual, Urine; Future  - RPR (for monitoring); Future  - emtricitabine-tenofovir alafen (DESCOVY) 200-25 mg Tab; Take 1 tablet by mouth once daily.  Dispense: 30 tablet; Refill: 3    2. Positive RPR test  - RPR (for monitoring); Future    3. Erectile dysfunction, unspecified erectile dysfunction type  - Continued Cialis for erectile dysfunction, switching to 20 mg prn dose  - Noted improvement in erectile dysfunction symptoms with daily Cialis.  - Advised the patient to report any side effects or changes in efficacy.  - tadalafiL (CIALIS) 20 MG Tab; Take 1 tablet (20 mg total) by mouth once daily.  Dispense: 30 tablet; Refill: 5    4. Anxiety  - Evaluated anxiety related to ongoing dog custody dispute, determining short-term anxiolytic therapy appropriate.  - Discussed potential long-term risks of benzodiazepine use, including dependence and cognitive issues.  - Prescribed Xanax 0.25 mg as needed for anxiety, with option to take 2 tablets if needed.  - Noted that the patient's anxiety is affecting their ability to work.  - Confirmed that the patient is currently engaged in therapy.  - Advised the patient to use the medication judiciously and to report any side effects or  concerns.  - ALPRAZolam (XANAX) 0.25 MG tablet; Take 1 tablet (0.25 mg total) by mouth 3 (three) times daily.  Dispense: 30 tablet; Refill: 1       - Evaluated the patient's persistent cough, especially prominent in the morning and at night, which has been ongoing since January.  - Noted the cough is phlegmy and sometimes causes choking during sleep.  - Considered post-nasal drip as the cause of chronic cough, given clear lungs and lack of other symptoms such as congestion, rhinitis, or heartburn.  - Reviewed previous treatments including Zyrtec and Flonase.  - Recommend nasal antihistamine (Astepro) before bedtime to potentially alleviate nighttime and morning cough symptoms.  - Instructed the patient to report any changes in cough symptoms or if the new treatment is ineffective.  - Evaluated the patient's persistent cough, possibly related to post-nasal drip.  - Explained the difference between Flonase (better for long-term use) and Astepro (quicker acting but shorter duration).  - Prescribed Astepro nasal spray, to be used before bedtime.  - Instructed the patient on proper administration technique for the nasal spray.  - Advised the patient to monitor for any side effects or changes in symptoms.      FOLLOW-UP:  RTC 3 mos or sooner prirais Sinclair MD  3/21/2025    This note was prepared using voice-recognition software.  Although efforts are made to proofread the note, some errors may persist in the final document.         [1]   Patient Active Problem List  Diagnosis    On pre-exposure prophylaxis for HIV    Androgenic alopecia    History of Fever blisters    STD (male)    Chronic cough   [2]   Current Outpatient Medications:     cetirizine (ZYRTEC) 10 MG tablet, Take 1 tablet (10 mg total) by mouth once daily., Disp: 30 tablet, Rfl: 0    tadalafiL (CIALIS) 5 MG tablet, Take 1 tablet (5 mg total) by mouth daily as needed for Erectile Dysfunction., Disp: 30 tablet, Rfl: 5    triamcinolone acetonide  0.1% (KENALOG) 0.1 % cream, Apply topically 2 (two) times daily., Disp: 15 g, Rfl: 3    valACYclovir (VALTREX) 1000 MG tablet, Take 2 tablets (2,000 mg total) by mouth 2 (two) times daily as needed (as needed for fever blister)., Disp: 30 tablet, Rfl: 2    ALPRAZolam (XANAX) 0.25 MG tablet, Take 1 tablet (0.25 mg total) by mouth 3 (three) times daily., Disp: 30 tablet, Rfl: 1    emtricitabine-tenofovir alafen (DESCOVY) 200-25 mg Tab, Take 1 tablet by mouth once daily., Disp: 30 tablet, Rfl: 3    tadalafiL (CIALIS) 20 MG Tab, Take 1 tablet (20 mg total) by mouth once daily., Disp: 30 tablet, Rfl: 5

## 2025-04-02 ENCOUNTER — TELEPHONE (OUTPATIENT)
Dept: ENDOSCOPY | Facility: HOSPITAL | Age: 45
End: 2025-04-02

## 2025-04-02 NOTE — TELEPHONE ENCOUNTER
Patient confirmed scheduled procedure for 4/7/25. Reviewed all preop instructions. Patient confirmed ride home.

## 2025-04-03 NOTE — H&P
Short Stay Endoscopy History and Physical    PCP - Yuriy Sinclair MD  Referring Physician - PRE-ADMIT NURSE 1, ENDO -[facility]  No address on file    Procedure - Colonoscopy  ASA - per anesthesia  Mallampati - per anesthesia  History of Anesthesia problems - no  Family history Anesthesia problems -  no   Plan of anesthesia - General    HPI  45 y.o. male  Reason for procedure:   Colon cancer screening [Z12.11]        ROS:  Constitutional: No fevers, chills, No weight loss  CV: No chest pain  Pulm: No cough, No shortness of breath  GI: see HPI    Medical History:  has no past medical history on file.    Surgical History:  has a past surgical history that includes none.    Family History: family history includes Colon cancer in his maternal grandmother; Colon cancer (age of onset: 58) in his maternal uncle; Hyperlipidemia in his father; Hypertension in his father; Thyroid disease in his mother..    Social History:  reports that he has never smoked. He has never been exposed to tobacco smoke. He has never used smokeless tobacco. He reports current alcohol use of about 3.0 standard drinks of alcohol per week. He reports that he does not use drugs.    Review of patient's allergies indicates:  No Known Allergies    Medications:   Prescriptions Prior to Admission[1]    Physical Exam:    Vital Signs: There were no vitals filed for this visit.    General Appearance: Well appearing in no acute distress  Abdomen: Soft, non tender, non distended with normal bowel sounds, no masses    Labs:  Lab Results   Component Value Date    WBC 6.39 05/02/2024    HGB 14.7 05/02/2024    HCT 42.5 05/02/2024     05/02/2024    CHOL 155 05/02/2024    TRIG 62 05/02/2024    HDL 53 05/02/2024    ALT 19 05/02/2024    AST 22 05/02/2024     05/02/2024    K 5.2 (H) 05/02/2024     05/02/2024    CREATININE 1.3 05/02/2024    BUN 20 05/02/2024    CO2 25 05/02/2024    TSH 1.078 10/09/2024    HGBA1C 5.0 05/02/2024       I  have explained the risks and benefits of this endoscopic procedure to the patient including but not limited to bleeding, inflammation, infection, perforation, and death.    Assessment/Plan:     CRC Screening     - Proceed with colonoscopy     Giovana Gibbons MD  Gastroenterology   [facility]          [1]   Medications Prior to Admission   Medication Sig Dispense Refill Last Dose/Taking    ALPRAZolam (XANAX) 0.25 MG tablet Take 1 tablet (0.25 mg total) by mouth 3 (three) times daily. 30 tablet 1     cetirizine (ZYRTEC) 10 MG tablet Take 1 tablet (10 mg total) by mouth once daily. 30 tablet 0     emtricitabine-tenofovir alafen (DESCOVY) 200-25 mg Tab Take 1 tablet by mouth once daily. 30 tablet 3     tadalafiL (CIALIS) 20 MG Tab Take 1 tablet (20 mg total) by mouth once daily. 30 tablet 5     tadalafiL (CIALIS) 5 MG tablet Take 1 tablet (5 mg total) by mouth daily as needed for Erectile Dysfunction. 30 tablet 5     triamcinolone acetonide 0.1% (KENALOG) 0.1 % cream Apply topically 2 (two) times daily. 15 g 3     valACYclovir (VALTREX) 1000 MG tablet Take 2 tablets (2,000 mg total) by mouth 2 (two) times daily as needed (as needed for fever blister). 30 tablet 2

## 2025-04-07 ENCOUNTER — HOSPITAL ENCOUNTER (OUTPATIENT)
Facility: HOSPITAL | Age: 45
Discharge: HOME | End: 2025-04-07
Attending: STUDENT IN AN ORGANIZED HEALTH CARE EDUCATION/TRAINING PROGRAM | Admitting: STUDENT IN AN ORGANIZED HEALTH CARE EDUCATION/TRAINING PROGRAM
Payer: OTHER GOVERNMENT

## 2025-04-07 VITALS
BODY MASS INDEX: 24.38 KG/M2 | DIASTOLIC BLOOD PRESSURE: 77 MMHG | HEART RATE: 57 BPM | RESPIRATION RATE: 20 BRPM | WEIGHT: 180 LBS | HEIGHT: 72 IN | OXYGEN SATURATION: 100 % | SYSTOLIC BLOOD PRESSURE: 121 MMHG | TEMPERATURE: 98 F

## 2025-04-07 DIAGNOSIS — Z12.11 COLON CANCER SCREENING: Primary | ICD-10-CM

## 2025-04-07 PROCEDURE — 37000008 HC ANESTHESIA 1ST 15 MINUTES: Performed by: STUDENT IN AN ORGANIZED HEALTH CARE EDUCATION/TRAINING PROGRAM

## 2025-04-07 PROCEDURE — 45385 COLONOSCOPY W/LESION REMOVAL: CPT | Mod: 33,,, | Performed by: STUDENT IN AN ORGANIZED HEALTH CARE EDUCATION/TRAINING PROGRAM

## 2025-04-07 PROCEDURE — 27201089 HC SNARE, DISP (ANY): Performed by: STUDENT IN AN ORGANIZED HEALTH CARE EDUCATION/TRAINING PROGRAM

## 2025-04-07 PROCEDURE — 63600175 PHARM REV CODE 636 W HCPCS: Performed by: NURSE ANESTHETIST, CERTIFIED REGISTERED

## 2025-04-07 PROCEDURE — 25000003 PHARM REV CODE 250: Performed by: NURSE ANESTHETIST, CERTIFIED REGISTERED

## 2025-04-07 PROCEDURE — 88305 TISSUE EXAM BY PATHOLOGIST: CPT | Mod: TC | Performed by: STUDENT IN AN ORGANIZED HEALTH CARE EDUCATION/TRAINING PROGRAM

## 2025-04-07 PROCEDURE — 45385 COLONOSCOPY W/LESION REMOVAL: CPT | Performed by: STUDENT IN AN ORGANIZED HEALTH CARE EDUCATION/TRAINING PROGRAM

## 2025-04-07 PROCEDURE — 37000009 HC ANESTHESIA EA ADD 15 MINS: Performed by: STUDENT IN AN ORGANIZED HEALTH CARE EDUCATION/TRAINING PROGRAM

## 2025-04-07 PROCEDURE — 94761 N-INVAS EAR/PLS OXIMETRY MLT: CPT

## 2025-04-07 PROCEDURE — 99900035 HC TECH TIME PER 15 MIN (STAT)

## 2025-04-07 RX ORDER — LIDOCAINE HYDROCHLORIDE 20 MG/ML
INJECTION INTRAVENOUS
Status: DISCONTINUED | OUTPATIENT
Start: 2025-04-07 | End: 2025-04-07

## 2025-04-07 RX ORDER — PROPOFOL 10 MG/ML
VIAL (ML) INTRAVENOUS
Status: DISCONTINUED | OUTPATIENT
Start: 2025-04-07 | End: 2025-04-07

## 2025-04-07 RX ORDER — SODIUM CHLORIDE 9 MG/ML
INJECTION, SOLUTION INTRAVENOUS CONTINUOUS
Status: DISCONTINUED | OUTPATIENT
Start: 2025-04-07 | End: 2025-04-07 | Stop reason: HOSPADM

## 2025-04-07 RX ORDER — PROPOFOL 10 MG/ML
VIAL (ML) INTRAVENOUS CONTINUOUS PRN
Status: DISCONTINUED | OUTPATIENT
Start: 2025-04-07 | End: 2025-04-07

## 2025-04-07 RX ADMIN — LIDOCAINE HYDROCHLORIDE 100 MG: 20 INJECTION INTRAVENOUS at 11:04

## 2025-04-07 RX ADMIN — PROPOFOL 90 MG: 10 INJECTION, EMULSION INTRAVENOUS at 11:04

## 2025-04-07 RX ADMIN — SODIUM CHLORIDE: 0.9 INJECTION, SOLUTION INTRAVENOUS at 10:04

## 2025-04-07 RX ADMIN — PROPOFOL 150 MCG/KG/MIN: 10 INJECTION, EMULSION INTRAVENOUS at 11:04

## 2025-04-07 NOTE — ANESTHESIA POSTPROCEDURE EVALUATION
Anesthesia Post Evaluation    Patient: Jono Cuello    Procedure(s) Performed: Procedure(s) (LRB):  COLONOSCOPY, SCREENING, LOW RISK PATIENT (N/A)    Final Anesthesia Type: general      Patient location during evaluation: PACU  Patient participation: Yes- Able to Participate  Level of consciousness: awake and alert  Post-procedure vital signs: reviewed and stable  Pain management: adequate  Airway patency: patent    PONV status at discharge: No PONV  Anesthetic complications: no      Cardiovascular status: stable  Respiratory status: spontaneous ventilation  Hydration status: euvolemic  Follow-up not needed.          Vitals Value Taken Time   /77 04/07/25 11:38   Temp 36.8 °C (98.2 °F) 04/07/25 11:23   Pulse 57 04/07/25 11:46   Resp 20 04/07/25 11:46   SpO2 100 % 04/07/25 11:46         Event Time   Out of Recovery 11:38:02         Pain/Tien Score: Tien Score: 10 (4/7/2025 11:28 AM)

## 2025-04-07 NOTE — PR[employer]ATION PATIENT INSTRUCTIONS
Discharge Summary/Instructions after an Endoscopic Procedure  Patient Name: Jono Cuello  Patient MRN: [id number]  Patient YOB: 1980  Monday, April 7, 2025  Giovana Gibbons MD  Dear patient,  As a result of recent federal legislation (The Federal Cures Act), you may   receive lab or pathology results from your procedure in your [facility]   account before your physician is able to contact you. Your physician or   their representative will relay the results to you with their   recommendations at their soonest availability.  Thank you,  RESTRICTIONS:  During your procedure today, you received medications for sedation.  These   medications may affect your judgment, balance and coordination.  Therefore,   for 24 hours, you have the following restrictions:   - DO NOT drive a car, operate machinery, make legal/financial decisions,   sign important papers or drink alcohol.    ACTIVITY:  Today: no heavy lifting, straining or running due to procedural   sedation/anesthesia.  The following day: return to full activity including work.  DIET:  Eat and drink normally unless instructed otherwise.     TREATMENT FOR COMMON SIDE EFFECTS:  - Mild abdominal pain, nausea, belching, bloating or excessive gas:  rest,   eat lightly and use a heating pad.  - Sore Throat: treat with throat lozenges and/or gargle with warm salt   water.  - Because air was used during the procedure, expelling large amounts of air   from your rectum or belching is normal.  - If a bowel prep was taken, you may not have a bowel movement for 1-3 days.    This is normal.  SYMPTOMS TO WATCH FOR AND REPORT TO YOUR PHYSICIAN:  1. Abdominal pain or bloating, other than gas cramps.  2. Chest pain.  3. Back pain.  4. Signs of infection such as: chills or fever occurring within 24 hours   after the procedure.  5. Rectal bleeding, which would show as bright red, maroon, or black stools.   (A tablespoon of blood from the rectum is not serious, especially if    hemorrhoids are present.)  6. Vomiting.  7. Weakness or dizziness.  GO DIRECTLY TO THE NEAREST EMERGENCY ROOM IF YOU HAVE ANY OF THE FOLLOWING:      Difficulty breathing              Chills and/or fever over 101 F   Persistent vomiting and/or vomiting blood   Severe abdominal pain   Severe chest pain   Black, tarry stools   Bleeding- more than one tablespoon   Any other symptom or condition that you feel may need urgent attention  Your doctor recommends these additional instructions:  If any biopsies were taken, your doctors clinic will contact you in 1 to 2   weeks with any results.  - Discharge patient to home (ambulatory).   - Resume regular diet today.   - Continue present medications.   - Await pathology results.   - Repeat colonoscopy in 5 years for surveillance.  For questions, problems or results please call your physician - Giovana Gibbons MD at Work:  [phone].  [facility], EMERGENCY ROOM PHONE NUMBER: [phone]  IF A COMPLICATION OR EMERGENCY SITUATION ARISES AND YOU ARE UNABLE TO REACH   YOUR PHYSICIAN - GO DIRECTLY TO THE EMERGENCY ROOM.  Giovana Gibbons MD  4/7/2025 11:30:29 AM  This report has been verified and signed electronically.  Dear patient,  As a result of recent federal legislation (The Federal Cures Act), you may   receive lab or pathology results from your procedure in your [facility]   account before your physician is able to contact you. Your physician or   their representative will relay the results to you with their   recommendations at their soonest availability.  Thank you,  PR[employer]ATION

## 2025-04-07 NOTE — TRANSFER OF CARE
Anesthesia Transfer of Care Note    Patient: Jono Cuello    Procedure(s) Performed: Procedure(s) (LRB):  COLONOSCOPY, SCREENING, LOW RISK PATIENT (N/A)    Patient location: PACU    Anesthesia Type: general    Transport from OR: Transported from OR on room air with adequate spontaneous ventilation    Post pain: adequate analgesia    Post assessment: no apparent anesthetic complications and tolerated procedure well    Post vital signs: stable    Level of consciousness: alert, awake and oriented    Nausea/Vomiting: no nausea/vomiting    Complications: none    Transfer of care protocol was followed      Last vitals: Visit Vitals  /86 (BP Location: Left arm, Patient Position: Lying)   Pulse (!) 52   Temp 36.8 °C (98.2 °F) (Skin)   Resp 18   Ht 6' (1.829 m)   Wt 81.6 kg (180 lb)   SpO2 100%   BMI 24.41 kg/m²

## 2025-04-07 NOTE — PLAN OF CARE
Pt in preop bay 35, VSS, meds given and IV inserted. Pt denies any open wounds on body or the use of any weight loss injections. Pt needs consents, otherwise ready to roll.

## 2025-04-09 ENCOUNTER — RESULTS FOLLOW-UP (OUTPATIENT)
Dept: GASTROENTEROLOGY | Facility: CLINIC | Age: 45
End: 2025-04-09

## 2025-04-09 LAB
ESTROGEN SERPL-MCNC: NORMAL PG/ML
INSULIN SERPL-ACNC: NORMAL U[IU]/ML
LAB AP CLINICAL INFORMATION: NORMAL
LAB AP GROSS DESCRIPTION: NORMAL
LAB AP PERFORMING LOCATION(S): NORMAL
LAB AP REPORT FOOTNOTES: NORMAL

## 2025-04-24 ENCOUNTER — OFFICE VISIT (OUTPATIENT)
Dept: URGENT CARE | Facility: CLINIC | Age: 45
End: 2025-04-24
Payer: OTHER GOVERNMENT

## 2025-04-24 VITALS
SYSTOLIC BLOOD PRESSURE: 105 MMHG | HEIGHT: 72 IN | OXYGEN SATURATION: 95 % | DIASTOLIC BLOOD PRESSURE: 57 MMHG | WEIGHT: 179.88 LBS | BODY MASS INDEX: 24.36 KG/M2 | RESPIRATION RATE: 16 BRPM | TEMPERATURE: 99 F | HEART RATE: 62 BPM

## 2025-04-24 DIAGNOSIS — M76.61 RIGHT ACHILLES TENDINITIS: Primary | ICD-10-CM

## 2025-04-24 RX ORDER — KETOROLAC TROMETHAMINE 30 MG/ML
30 INJECTION, SOLUTION INTRAMUSCULAR; INTRAVENOUS
Status: COMPLETED | OUTPATIENT
Start: 2025-04-24 | End: 2025-04-24

## 2025-04-24 RX ADMIN — KETOROLAC TROMETHAMINE 30 MG: 30 INJECTION, SOLUTION INTRAMUSCULAR; INTRAVENOUS at 01:04

## 2025-04-24 NOTE — PATIENT INSTRUCTIONS
Rest ice and elevate.      You can use oral anti-inflammatory such as Advil up to 600 mg every 8 hours as needed for pain and you can also supplement that with the acetaminophen/Tylenol for extra pain control.      If symptoms not improving in the next 5-7 days or they are worsening at any time I recommend following up with the orthopedic or podiatry specialist.      An as needed referral has been placed through the [facility] system.You can call our  line at [phone] and they can assist you in making this specialist appointment at any time.

## 2025-05-23 NOTE — PROGRESS NOTES
Subjective:      Patient ID: Jono Cuello is a 45 y.o. male.    Vitals:  height is 6' (1.829 m) and weight is 81.6 kg (179 lb 14.3 oz). His oral temperature is 98.6 °F (37 °C). His blood pressure is 105/57 (abnormal) and his pulse is 62. His respiration is 16 and oxygen saturation is 95%.     Chief Complaint: Ankle Pain    Jono Cuello is a 45 y.o. male c/o Right ankle pain. Pain is located on R achilles tendon. Pain started after running last week ([place] 10 K with only about a week of training prior), and pain worsened after running yesterday. Tx w/ advil at 9 am.       Ankle Pain       ROS     See HPI for pertinent positives and negatives    Objective:     Physical Exam  Constitutional: Pt oriented to person, place, and time.  Non-toxic appearance.   Patient does not appear ill. No distress. normal  HENT: No icterus or facial swelling appreciated  Head: Normocephalic and atraumatic.   Nose: No congestion.   Pulmonary/Chest: Effort normal. No stridor. No respiratory distress.   Abdominal: Normal appearance. Abdomen exhibits no distension.   Musculoskeletal:      RLE:  There is a maximum of tenderness to palpation overlying the insertion of the Achilles tendon in the posterior ankle.  No swelling.  No erythema of the leg.  Patient able to flex and dorsiflex the foot and ankle with a normal.  There is no deformity of the posterior lower leg.  Tendon palpated in the appears intact  Neurological: no focal deficit. Patient is alert and oriented to person, place, and time.   Skin: Skin is not diaphoretic and not pale. no jaundice  Psychiatric: Patients behavior is normal. Mood, judgment and thought content normal.     Assessment:     1. Right Achilles tendinitis        Plan:       Right Achilles tendinitis  -     ketorolac injection 30 mg  -     Walking Boot For Home Use  -     Ambulatory referral/consult to Orthopedics      Patient Instructions   Rest ice and elevate.      You can use oral anti-inflammatory  such as Advil up to 600 mg every 8 hours as needed for pain and you can also supplement that with the acetaminophen/Tylenol for extra pain control.      If symptoms not improving in the next 5-7 days or they are worsening at any time I recommend following up with the orthopedic or podiatry specialist.      An as needed referral has been placed through the [facility] system.You can call our  line at [phone] and they can assist you in making this specialist appointment at any time.                   Never smoker

## 2025-06-04 NOTE — PROGRESS NOTES
[facility] Progress Note  6/10/2025          Reason for Visit:      had concerns including hernia (groin).       History of Present Illness:     Patient presents today for evaluation of a hernia    Hernia  He noticed a groin hernia approximately one month ago, characterized by a bulge that becomes more prominent with straining or bearing down. He experiences increasing intermittent pain that worsens with coughing and passing gas. The pain is localized to the groin area where the bulge is present, without any scrotal fullness. He reports discomfort while walking and the day following exercise. His bowel movements are normal.          Problem List:     Problem List[1]      Medications:     Current Medications[2]      Review of Systems:     Review of Systems   Constitutional:  Negative for chills and fever.   HENT:  Negative for ear pain and sore throat.    Respiratory:  Negative for cough, shortness of breath and wheezing.    Cardiovascular:  Negative for chest pain and palpitations.   Gastrointestinal:  Negative for constipation, diarrhea, nausea and vomiting.   Genitourinary:  Negative for dysuria and hematuria.   Musculoskeletal:  Negative for joint swelling and joint deformity.   Neurological:  Negative for dizziness and weakness.         Physical Exam:     Temp:             Blood Pressure:  108/80        Pulse:   72     Respirations:     Weight:   81.7 kg (180 lb 1.9 oz)  Height:   6' (1.829 m)  BMI:     Body mass index is 24.43 kg/m².    Physical Exam  Constitutional:       General: He is not in acute distress.  Cardiovascular:      Rate and Rhythm: Normal rate and regular rhythm.   Pulmonary:      Effort: Pulmonary effort is normal. No respiratory distress.      Breath sounds: No wheezing.   Genitourinary:     Comments: There is a reducible hernia in the right inguinal area,  Skin:     General: Skin is warm.   Neurological:      General: No focal deficit present.      Mental Status: He is  alert and oriented to person, place, and time.         Assessment and Plan:     Visit Summary:  Assessed reported groin hernia, present for about a month with occasional pain and bulging when straining.  Determined surgical intervention appropriate but not urgently required.  Recommend deferring surgery until after upcoming wedding in 2 weeks.    - Refer to Dr. Hoyos at [facility] for surgical evaluation of right-sided groin hernia  - Advise patient to avoid heavy lifting and strenuous workouts  - Surgical consultation after patient returns from wedding       1. Unilateral inguinal hernia without obstruction or gangrene, recurrence not specified  - Ambulatory referral/consult to General Surgery; Future    - Monitored patient's right-sided groin hernia present for about 1 month with occasional pain when straining.  - Discussed risks including potential incarceration and explained warning signs: inability to reduce hernia, significant pain, swelling, and tenderness.  - Reviewed surgical approach for repair, including possible mesh use and post-operative lifting restrictions.  - Mentioned possibility of recurrence even after successful repair.  - Advised to avoid heavy lifting and strenuous workouts.  - Referred to Dr. Hoyos at [facility] for surgical evaluation after patient returns from wedding.  - Instructed to seek emergency care if signs of incarceration develop or if symptoms worsen.    - Refilled Valtrex to be sent to [facility] on [facility].             Yuriy Sinclair MD  6/10/2025    This note was prepared using voice-recognition software.  Although efforts are made to proofread the note, some errors may persist in the final document.         [1]   Patient Active Problem List  Diagnosis    On pre-exposure prophylaxis for HIV    Androgenic alopecia    History of Fever blisters    STD (male)    Chronic cough   [2]   Current Outpatient Medications:     emtricitabine-tenofovir alafen (DESCOVY) 200-25 mg  Tab, Take 1 tablet by mouth once daily., Disp: 30 tablet, Rfl: 3    tadalafiL (CIALIS) 20 MG Tab, Take 1 tablet (20 mg total) by mouth once daily., Disp: 30 tablet, Rfl: 5    valACYclovir (VALTREX) 1000 MG tablet, Take 2 tablets (2,000 mg total) by mouth 2 (two) times daily as needed (as needed for fever blister)., Disp: 30 tablet, Rfl: 2

## 2025-06-10 ENCOUNTER — OFFICE VISIT (OUTPATIENT)
Dept: PRIMARY CARE CLINIC | Facility: CLINIC | Age: 45
End: 2025-06-10
Payer: OTHER GOVERNMENT

## 2025-06-10 VITALS
OXYGEN SATURATION: 96 % | SYSTOLIC BLOOD PRESSURE: 108 MMHG | BODY MASS INDEX: 24.4 KG/M2 | WEIGHT: 180.13 LBS | HEIGHT: 72 IN | HEART RATE: 72 BPM | DIASTOLIC BLOOD PRESSURE: 80 MMHG

## 2025-06-10 DIAGNOSIS — K40.90 UNILATERAL INGUINAL HERNIA WITHOUT OBSTRUCTION OR GANGRENE, RECURRENCE NOT SPECIFIED: Primary | ICD-10-CM

## 2025-06-10 PROCEDURE — 99999 PR PBB SHADOW E&M-EST. PATIENT-LVL III: CPT | Mod: PBBFAC,,, | Performed by: INTERNAL MEDICINE

## 2025-06-10 PROCEDURE — 99214 OFFICE O/P EST MOD 30 MIN: CPT | Mod: S$PBB,,, | Performed by: INTERNAL MEDICINE

## 2025-06-10 PROCEDURE — 99213 OFFICE O/P EST LOW 20 MIN: CPT | Mod: PBBFAC,PN | Performed by: INTERNAL MEDICINE

## 2025-06-10 RX ORDER — VALACYCLOVIR HYDROCHLORIDE 1 G/1
2000 TABLET, FILM COATED ORAL 2 TIMES DAILY PRN
Qty: 30 TABLET | Refills: 2 | Status: SHIPPED | OUTPATIENT
Start: 2025-06-10 | End: 2025-06-11

## 2025-08-19 ENCOUNTER — TELEPHONE (OUTPATIENT)
Dept: SURGERY | Facility: CLINIC | Age: 45
End: 2025-08-19
Payer: OTHER GOVERNMENT

## 2025-09-02 ENCOUNTER — OFFICE VISIT (OUTPATIENT)
Dept: SURGERY | Facility: CLINIC | Age: 45
End: 2025-09-02
Payer: OTHER GOVERNMENT

## 2025-09-02 VITALS
SYSTOLIC BLOOD PRESSURE: 132 MMHG | HEIGHT: 72 IN | OXYGEN SATURATION: 99 % | WEIGHT: 180 LBS | BODY MASS INDEX: 24.38 KG/M2 | DIASTOLIC BLOOD PRESSURE: 74 MMHG | HEART RATE: 63 BPM

## 2025-09-02 DIAGNOSIS — K40.90 NON-RECURRENT UNILATERAL INGUINAL HERNIA WITHOUT OBSTRUCTION OR GANGRENE: Primary | ICD-10-CM

## 2025-09-02 PROCEDURE — 99203 OFFICE O/P NEW LOW 30 MIN: CPT | Mod: S$PBB,,, | Performed by: SURGERY

## 2025-09-02 PROCEDURE — 99214 OFFICE O/P EST MOD 30 MIN: CPT | Mod: PBBFAC | Performed by: SURGERY

## 2025-09-02 PROCEDURE — 99999 PR PBB SHADOW E&M-EST. PATIENT-LVL IV: CPT | Mod: PBBFAC,,, | Performed by: SURGERY
